# Patient Record
Sex: FEMALE | Race: WHITE | NOT HISPANIC OR LATINO | Employment: FULL TIME | URBAN - METROPOLITAN AREA
[De-identification: names, ages, dates, MRNs, and addresses within clinical notes are randomized per-mention and may not be internally consistent; named-entity substitution may affect disease eponyms.]

---

## 2017-08-03 ENCOUNTER — OFFICE VISIT (OUTPATIENT)
Dept: MEDICAL GROUP | Facility: PHYSICIAN GROUP | Age: 32
End: 2017-08-03
Payer: COMMERCIAL

## 2017-08-03 ENCOUNTER — HOSPITAL ENCOUNTER (OUTPATIENT)
Dept: RADIOLOGY | Facility: MEDICAL CENTER | Age: 32
End: 2017-08-03
Attending: NURSE PRACTITIONER
Payer: COMMERCIAL

## 2017-08-03 VITALS
BODY MASS INDEX: 21.25 KG/M2 | OXYGEN SATURATION: 99 % | DIASTOLIC BLOOD PRESSURE: 60 MMHG | RESPIRATION RATE: 16 BRPM | WEIGHT: 135.4 LBS | HEIGHT: 67 IN | HEART RATE: 75 BPM | TEMPERATURE: 98.4 F | SYSTOLIC BLOOD PRESSURE: 90 MMHG

## 2017-08-03 DIAGNOSIS — F41.9 ANXIETY: ICD-10-CM

## 2017-08-03 DIAGNOSIS — M25.511 CHRONIC RIGHT SHOULDER PAIN: ICD-10-CM

## 2017-08-03 DIAGNOSIS — G89.29 CHRONIC RIGHT SHOULDER PAIN: ICD-10-CM

## 2017-08-03 PROCEDURE — 99213 OFFICE O/P EST LOW 20 MIN: CPT | Performed by: NURSE PRACTITIONER

## 2017-08-03 PROCEDURE — 73030 X-RAY EXAM OF SHOULDER: CPT | Mod: RT

## 2017-08-03 RX ORDER — IBUPROFEN 800 MG/1
800 TABLET ORAL EVERY 6 HOURS PRN
Qty: 90 TAB | Refills: 1 | Status: SHIPPED | OUTPATIENT
Start: 2017-08-03 | End: 2018-01-15

## 2017-08-03 NOTE — PROGRESS NOTES
Chief Complaint   Patient presents with   • Establish Care     R shoulder issues and back pain       HPI:    Kendra Perez is a 32 y.o. female here to establish care and to discuss the following:    Anxiety  Patient has been taking Zoloft 50 mg daily for approximately one year while having some personal struggles. She states that she is doing well on the Zoloft. She denies any SI/HI. Patient's requesting refills today.    Chronic right shoulder pain  Patient has intermittent chronic right shoulder pain from many years of playing softball. She states in the last 2 weeks it has been bothering her. 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        Current medicines (including changes today)  Current Outpatient Prescriptions   Medication Sig Dispense Refill   • Cetirizine HCl (ZYRTEC ALLERGY PO) Take  by mouth.     • sertraline (ZOLOFT) 50 MG Tab Take 1 Tab by mouth every day. 30 Tab 3   • ibuprofen (MOTRIN) 800 MG Tab Take 1 Tab by mouth every 6 hours as needed for Inflammation. 90 Tab 1     No current facility-administered medications for this visit.     She  has a past medical history of Shoulder tendonitis.  She  has past surgical history that includes lumpectomy (2004) and other.  Social History   Substance Use Topics   • Smoking status: Current Some Day Smoker     Types: Cigarettes   • Smokeless tobacco: Never Used   • Alcohol Use: 6.0 oz/week     10 Standard drinks or equivalent per week     Social History     Social History Narrative     Family History   Problem Relation Age of Onset   • Arthritis Mother 40     rheumatoid   • Hypertension Father    • Lung Disease Brother      asthma   • Cancer Maternal Grandmother      breast, pancreatic   • Psychiatry Maternal Grandfather      dementia   • Heart Disease Paternal Grandmother    • Cancer Paternal Grandfather      leukemia   • Lung Disease Brother      asthma     Family Status   Relation Status Death Age   • Mother Alive    •  "Father Alive    • Brother Alive    • Maternal Grandmother  60     cancer   • Maternal Grandfather     • Paternal Grandmother     • Paternal Grandfather     • Brother Alive          ROS  Review of Systems   Constitutional: Negative for fever, chills, weight loss and malaise/fatigue.   HENT: Negative for ear pain, nosebleeds, congestion, sore throat and neck pain.    Eyes: Negative for blurred vision.   Respiratory: Negative for cough, sputum production, shortness of breath and wheezing.    Cardiovascular: Negative for chest pain, palpitations,  and leg swelling.   Gastrointestinal: Negative for heartburn, nausea, vomiting, diarrhea and abdominal pain.   Genitourinary: Negative for dysuria, urgency and frequency.   Musculoskeletal: Positive for right shoulder pain  Skin: Negative for rash and itching.   Neurological: Negative for dizziness, tingling, tremors, sensory change, focal weakness and headaches.   Endo/Heme/Allergies: Does not bruise/bleed easily.   Psychiatric/Behavioral: Negative for depression, anxiety, suicidal ideas, insomnia and memory loss.  Positive for anxiety    All other systems reviewed and are negative except as in HPI.    Health Maintenance:  Pap smear: Dr. Woodruff 2016 - normal  Immunizations: up to date           Objective:     Blood pressure 90/60, pulse 75, temperature 36.9 °C (98.4 °F), resp. rate 16, height 1.702 m (5' 7.01\"), weight 61.417 kg (135 lb 6.4 oz), last menstrual period 2017, SpO2 99 %. Body mass index is 21.2 kg/(m^2).  Physical Exam:  Constitutional: Alert, no distress.  Skin: Warm, dry, good turgor, no rashes in visible areas.  Eye: Equal, round and reactive, conjunctiva clear, lids normal.  ENMT: Lips without lesions, good dentition, oropharynx clear. Ear canals are clear, TMs within normal limits bilaterally.   Neck: Trachea midline, no masses, no thyromegaly. No cervical or supraclavicular lymphadenopathy.  Respiratory: Unlabored " respiratory effort, lungs clear to auscultation, no wheezes, no ronchi.  Cardiovascular: Normal S1, S2, no murmur, no edema.  Abdomen: Soft, non-tender, no masses, no hepatosplenomegaly.  Psych: Alert and oriented x3, normal affect and mood.  MS: Ambulates independently with steady gait. Has full range of motion of all extremities and spine.  Neuro: Cranial nerves intact. DTRs within normal limits. No neurological deficits.  Shoulder Exam: no swelling, tenderness, instability; ligaments intact, FROM shoulder joint, no bicipital groove tenderness, no impingements signs, remainder of shoulder exam is normal, ipsilateral elbow, wrist and hand exam is normal, contralateral shoulder exam is normal    Inspection: no atrophy, deformity, skin lesions, surgical scars.   Active ROM: Abduction 180 degrees, adduction and internal rotation to T7,  Abduction external rotation to C7.Lift off able with pain      Assessment and Plan:   The following treatment plan was discussed   1. Chronic right shoulder pain  ibuprofen (MOTRIN) 800 MG Tab    DX-SHOULDER 2+ RIGHT    MR-SHOULDER-W/O RIGHT   2. Anxiety  sertraline (ZOLOFT) 50 MG Tab   Patient is willing to do physical therapy as needed  Records requested.  Followup: Return if symptoms worsen or fail to improve, for shoulder pain,, anxiety.   Please note that this dictation was created using voice recognition software. I have made every reasonable attempt to correct obvious errors, but I expect that there are errors of grammar and possibly content that I did not discover before finalizing the note.

## 2017-08-03 NOTE — ASSESSMENT & PLAN NOTE
Patient has been taking Zoloft 50 mg daily for approximately one year while having some personal struggles. She states that she is doing well on the Zoloft. She denies any SI/HI. Patient's requesting refills today.

## 2017-08-03 NOTE — Clinical Note
[facility]  DILLON Pereira  [address]  [place] [address]  Fax: [phone]   Authorization for Release/Disclosure of   Protected Health Information   Name: KEDNRA ORNELAS : 1985 SSN: [id number]   Address: [address] [place] [address] [place] [address] Phone:    [phone] (home) [phone] (work)   I authorize the entity listed below to release/disclose the PHI below to:   [facility]/DILLON Pereira and DILLON Pereira   Provider or Entity Name:Dr. Woodruff     Address   [facility], [facility], [facility]   Phone:    Fax:   Reason for request: continuity of care   Information to be released:    [  ] LAST COLONOSCOPY,  including any PATH REPORT and follow-up  [  ] LAST FIT/COLOGUARD RESULT [  ] LAST DEXA  [  ] LAST MAMMOGRAM  [X  ] LAST PAP  [  ] LAST LABS [  ] RETINA EXAM REPORT  [  ] IMMUNIZATION RECORDS  [  ] Release all info          DATES OF SERVICE OR TIME PERIOD TO BE DISCLOSED: _____________  I understand and acknowledge that:  * This Authorization may be revoked at any time by you in writing, except if your health information has already been used or disclosed.  * Your health information that will be used or disclosed as a result of you signing this authorization could be re-disclosed by the recipient. If this occurs, your re-disclosed health information may no longer be protected by State or Federal laws.  * You may refuse to sign this Authorization. Your refusal will not affect your ability to obtain treatment.  * This Authorization becomes effective upon signing and will  on (date) __________.      If no date is indicated, this Authorization will  one (1) year from the signature date.    Name: Kendra Ornelas       Date: 8/3/2017       PLEASE FAX REQUESTED RECORDS BACK TO: [phone]

## 2017-08-03 NOTE — MR AVS SNAPSHOT
"Kendra Perez   8/3/2017 1:20 PM   Office Visit   MRN: [id number]    Department:  [facility]   Dept Phone:  [phone]    Description:  Female : 1985   Provider:  DILLON Pereira           Reason for Visit     Establish Care R shoulder issues and back pain      Allergies as of 8/3/2017     No Known Allergies      You were diagnosed with     Chronic right shoulder pain   [648139]       Anxiety   [280265]         Vital Signs     Blood Pressure Pulse Temperature Respirations Height Weight    90/60 mmHg 75 36.9 °C (98.4 °F) 16 1.702 m (5' 7.01\") 61.417 kg (135 lb 6.4 oz)    Body Mass Index Oxygen Saturation Last Menstrual Period Smoking Status          21.20 kg/m2 99% 2017 Current Some Day Smoker        Basic Information     Date Of Birth Sex Race Ethnicity Preferred Language    1985 Female White Non- English      Your appointments     Aug 07, 2017  3:00 PM   [facility] [place]   [facility] [place] (--)    [address] [address]   [phone]              Problem List              ICD-10-CM Priority Class Noted - Resolved    Chronic right shoulder pain M25.511, G89.29   8/3/2017 - Present    Anxiety F41.9   8/3/2017 - Present      Health Maintenance        Date Due Completion Dates    PAP SMEAR 3/17/2005 ---    IMM INFLUENZA (1) 2017, 10/22/2015, 10/27/2014    IMM DTaP/Tdap/Td Vaccine (2 - Td) 2020            Current Immunizations     Hepatitis A Vaccine, Ped/Adol 2015    Hepatitis B Vaccine Non-Recombivax (Ped/Adol) 2009, 2008, 2008    Influenza TIV (IM) 10/27/2014    Influenza Vaccine Quad Inj (Pf) 2016 10:37 PM    Influenza Vaccine Quad Inj (Preserved) 10/22/2015    MMR Vaccine 2002, 1985    Tdap Vaccine 2010      Below and/or attached are the medications your provider expects you to take. Review all of your home medications and newly ordered medications with your " provider and/or pharmacist. Follow medication instructions as directed by your provider and/or pharmacist. Please keep your medication list with you and share with your provider. Update the information when medications are discontinued, doses are changed, or new medications (including over-the-counter products) are added; and carry medication information at all times in the event of emergency situations     Allergies:  No Known Allergies          Medications  Valid as of: August 03, 2017 -  3:21 PM    Generic Name Brand Name Tablet Size Instructions for use    Cetirizine HCl   Take  by mouth.        Ibuprofen (Tab) MOTRIN 800 MG Take 1 Tab by mouth every 6 hours as needed for Inflammation.        Sertraline HCl (Tab) ZOLOFT 50 MG Take 1 Tab by mouth every day.        .                 Medicines prescribed today were sent to:     [id number] - [place], [place] - [address]  [address]    [address] [address]    Phone: [phone] Fax: [phone]    Open 24 Hours?: No    [id number]  [place] - [address] [facility]    [address] [facility] [address]    Phone: [phone] Fax: [phone]    Open 24 Hours?: No      Medication refill instructions:       If your prescription bottle indicates you have medication refills left, it is not necessary to call your provider’s office. Please contact your pharmacy and they will refill your medication.    If your prescription bottle indicates you do not have any refills left, you may request refills at any time through one of the following ways: The online 3225 films system (except Urgent Care), by calling your provider’s office, or by asking your pharmacy to contact your provider’s office with a refill request. Medication refills are processed only during regular business hours and may not be available until the next business day. Your provider may request additional information or to  have a follow-up visit with you prior to refilling your medication.   *Please Note: Medication refills are assigned a new Rx number when refilled electronically. Your pharmacy may indicate that no refills were authorized even though a new prescription for the same medication is available at the pharmacy. Please request the medicine by name with the pharmacy before contacting your provider for a refill.        Your To Do List     Future Labs/Procedures Complete By Expires    DX-SHOULDER 2+ RIGHT  As directed 2/3/2018         [employer] Access Code: [email or web address]  Expires: 9/2/2017  1:04 PM    [employer]  A secure, online tool to manage your health information     [facility]’s [employer]® is a secure, online tool that connects you to your personalized health information from the privacy of your home -- day or night - making it very easy for you to manage your healthcare. Once the activation process is completed, you can even access your medical information using the [employer] eric, which is available for free in the Apple Eric store or Google Play store.     [employer] provides the following levels of access (as shown below):   My Chart Features   [facility] Doctor [facility]  Specialists [facility]  Urgent  Care Non-[facility]  Primary Care  Doctor   Email your healthcare team securely and privately 24/7 X X X    Manage appointments: schedule your next appointment; view details of past/upcoming appointments X      Request prescription refills. X      View recent personal medical records, including lab and immunizations X X X X   View health record, including health history, allergies, medications X X X X   Read reports about your outpatient visits, procedures, consult and ER notes X X X X   See your discharge summary, which is a recap of your hospital and/or ER visit that includes your diagnosis, lab results, and care plan. X X       How to register for [employer]  1. Go to  [email or web address].  2. Click on the Sign Up Now  box, which takes you to the New Member Sign Up page. You will need to provide the following information:  a. Enter your [employer] Access Code exactly as it appears at the top of this page. (You will not need to use this code after you’ve completed the sign-up process. If you do not sign up before the expiration date, you must request a new code.)   b. Enter your date of birth.   c. Enter your home email address.   d. Click Submit, and follow the next screen’s instructions.  3. Create a [employer] ID. This will be your [employer] login ID and cannot be changed, so think of one that is secure and easy to remember.  4. Create a [employer] password. You can change your password at any time.  5. Enter your Password Reset Question and Answer. This can be used at a later time if you forget your password.   6. Enter your e-mail address. This allows you to receive e-mail notifications when new information is available in [employer].  7. Click Sign Up. You can now view your health information.    For assistance activating your [employer] account, call [phone]        Quit Tobacco Information     Do you want to quit using tobacco?    Quitting tobacco decreases risks of cancer, heart and lung disease, increases life expectancy, improves sense of taste and smell, and increases spending money, among other benefits.    If you are thinking about quitting, we can help.  • [facility] Quit Tobacco Program: 402.456.8926  o Program occurs weekly for four weeks and includes pharmacist consultation on products to support quitting smoking or chewing tobacco. A provider referral is needed for pharmacist consultation.  • Tobacco Users Help Hotline: 0-366-QUIT-NOW (270-2865) or https://[place].quitlogix.org/  o Free, confidential telephone and online coaching for [place] residents. Sessions are designed on a schedule that is convenient for you. Eligible clients receive free nicotine replacement therapy.  • Nationally: www.smokefree.gov  o Information and  professional assistance to support both immediate and long-term needs as you become, and remain, a non-smoker. Smokefree.gov allows you to choose the help that best fits your needs.

## 2017-08-04 ENCOUNTER — TELEPHONE (OUTPATIENT)
Dept: MEDICAL GROUP | Facility: PHYSICIAN GROUP | Age: 32
End: 2017-08-04

## 2017-08-04 NOTE — ASSESSMENT & PLAN NOTE
Patient has intermittent chronic right shoulder pain from many years of playing softball. She states in the last 2 weeks it has been bothering her. 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

## 2017-08-05 NOTE — TELEPHONE ENCOUNTER
----- Message from DILLON Pereira sent at 8/4/2017  5:25 PM PDT -----  Please call patient. I have reviewed their most recent shoulder x-ray results. They are within normal limits.  Followup as discussed.    Please message me with any questions or concerns.

## 2017-08-07 ENCOUNTER — HOSPITAL ENCOUNTER (OUTPATIENT)
Dept: RADIOLOGY | Facility: MEDICAL CENTER | Age: 32
End: 2017-08-07
Attending: NURSE PRACTITIONER
Payer: COMMERCIAL

## 2017-08-07 DIAGNOSIS — G89.29 CHRONIC RIGHT SHOULDER PAIN: ICD-10-CM

## 2017-08-07 DIAGNOSIS — M25.511 CHRONIC RIGHT SHOULDER PAIN: ICD-10-CM

## 2017-08-07 PROCEDURE — 73221 MRI JOINT UPR EXTREM W/O DYE: CPT | Mod: RT

## 2017-08-08 DIAGNOSIS — S43.431A SUPERIOR GLENOID LABRUM LESION OF RIGHT SHOULDER, INITIAL ENCOUNTER: ICD-10-CM

## 2017-08-11 ENCOUNTER — HOSPITAL ENCOUNTER (OUTPATIENT)
Dept: LAB | Facility: MEDICAL CENTER | Age: 32
End: 2017-08-11
Payer: COMMERCIAL

## 2017-08-11 LAB
BDY FAT % MEASURED: 23.9 %
BP DIAS: 66 MMHG
BP SYS: 105 MMHG
CHOLEST SERPL-MCNC: 174 MG/DL (ref 100–199)
DIABETES HTDIA: NO
EVENT NAME HTEVT: NORMAL
FASTING HTFAS: YES
GLUCOSE SERPL-MCNC: 72 MG/DL (ref 65–99)
HDLC SERPL-MCNC: 81 MG/DL
HYPERTENSION HTHYP: NO
LDLC SERPL CALC-MCNC: 80 MG/DL
SCREENING LOC CITY HTCIT: NORMAL
SCREENING LOC STATE HTSTA: NORMAL
SCREENING LOCATION HTLOC: NORMAL
SMOKING HTSMO: YES
SUBSCRIBER ID HTSID: NORMAL
TRIGL SERPL-MCNC: 63 MG/DL (ref 0–149)

## 2017-08-11 PROCEDURE — S5190 WELLNESS ASSESSMENT BY NONPH: HCPCS

## 2017-08-11 PROCEDURE — 80061 LIPID PANEL: CPT

## 2017-08-11 PROCEDURE — 82947 ASSAY GLUCOSE BLOOD QUANT: CPT

## 2017-08-31 ENCOUNTER — TELEPHONE (OUTPATIENT)
Dept: OCCUPATIONAL MEDICINE | Facility: CLINIC | Age: 32
End: 2017-08-31

## 2017-09-18 ENCOUNTER — EH NON-PROVIDER (OUTPATIENT)
Dept: OCCUPATIONAL MEDICINE | Facility: CLINIC | Age: 32
End: 2017-09-18

## 2017-09-18 DIAGNOSIS — Z29.89 NEED FOR ISOLATION: ICD-10-CM

## 2017-09-18 PROCEDURE — 94375 RESPIRATORY FLOW VOLUME LOOP: CPT

## 2017-10-01 ENCOUNTER — IMMUNIZATION (OUTPATIENT)
Dept: OCCUPATIONAL MEDICINE | Facility: CLINIC | Age: 32
End: 2017-10-01

## 2017-10-01 DIAGNOSIS — Z23 NEED FOR VACCINATION: ICD-10-CM

## 2017-10-01 PROCEDURE — 90686 IIV4 VACC NO PRSV 0.5 ML IM: CPT | Performed by: PREVENTIVE MEDICINE

## 2018-01-15 DIAGNOSIS — Z01.812 PRE-OPERATIVE LABORATORY EXAMINATION: ICD-10-CM

## 2018-01-15 LAB — HCG SERPL QL: NEGATIVE

## 2018-01-15 PROCEDURE — 36415 COLL VENOUS BLD VENIPUNCTURE: CPT

## 2018-01-15 PROCEDURE — 84703 CHORIONIC GONADOTROPIN ASSAY: CPT

## 2018-01-15 NOTE — OR NURSING
"Pre-admit appointment completed. \"Preparing for your procedure\" sheet given to pt along with verbal and written instructions. Pt instructed to continue regularly prescribed medications through the day before surgery. Pt instructed to take the following medications the day of surgery with a sip of water, per anesthesia protocol; tylenol if needed.   "

## 2018-01-17 ENCOUNTER — HOSPITAL ENCOUNTER (OUTPATIENT)
Facility: MEDICAL CENTER | Age: 33
End: 2018-01-17
Attending: ORTHOPAEDIC SURGERY | Admitting: ORTHOPAEDIC SURGERY
Payer: COMMERCIAL

## 2018-01-17 VITALS
BODY MASS INDEX: 23.42 KG/M2 | HEART RATE: 77 BPM | OXYGEN SATURATION: 98 % | WEIGHT: 145.72 LBS | HEIGHT: 66 IN | DIASTOLIC BLOOD PRESSURE: 79 MMHG | TEMPERATURE: 97.3 F | SYSTOLIC BLOOD PRESSURE: 103 MMHG | RESPIRATION RATE: 14 BRPM

## 2018-01-17 PROCEDURE — 160041 HCHG SURGERY MINUTES - EA ADDL 1 MIN LEVEL 4: Performed by: ORTHOPAEDIC SURGERY

## 2018-01-17 PROCEDURE — 700111 HCHG RX REV CODE 636 W/ 250 OVERRIDE (IP)

## 2018-01-17 PROCEDURE — 500562 HCHG FIBERWIRE: Performed by: ORTHOPAEDIC SURGERY

## 2018-01-17 PROCEDURE — 700102 HCHG RX REV CODE 250 W/ 637 OVERRIDE(OP)

## 2018-01-17 PROCEDURE — 160035 HCHG PACU - 1ST 60 MINS PHASE I: Performed by: ORTHOPAEDIC SURGERY

## 2018-01-17 PROCEDURE — 160029 HCHG SURGERY MINUTES - 1ST 30 MINS LEVEL 4: Performed by: ORTHOPAEDIC SURGERY

## 2018-01-17 PROCEDURE — A9270 NON-COVERED ITEM OR SERVICE: HCPCS

## 2018-01-17 PROCEDURE — 160022 HCHG BLOCK: Performed by: ORTHOPAEDIC SURGERY

## 2018-01-17 PROCEDURE — A6223 GAUZE >16<=48 NO W/SAL W/O B: HCPCS | Performed by: ORTHOPAEDIC SURGERY

## 2018-01-17 PROCEDURE — 160009 HCHG ANES TIME/MIN: Performed by: ORTHOPAEDIC SURGERY

## 2018-01-17 PROCEDURE — 700101 HCHG RX REV CODE 250

## 2018-01-17 PROCEDURE — 502581 HCHG PACK, SHOULDER ARTHROSCOPY: Performed by: ORTHOPAEDIC SURGERY

## 2018-01-17 PROCEDURE — 160002 HCHG RECOVERY MINUTES (STAT): Performed by: ORTHOPAEDIC SURGERY

## 2018-01-17 PROCEDURE — 502000 HCHG MISC OR IMPLANTS RC 0278: Performed by: ORTHOPAEDIC SURGERY

## 2018-01-17 PROCEDURE — 160048 HCHG OR STATISTICAL LEVEL 1-5: Performed by: ORTHOPAEDIC SURGERY

## 2018-01-17 PROCEDURE — 160046 HCHG PACU - 1ST 60 MINS PHASE II: Performed by: ORTHOPAEDIC SURGERY

## 2018-01-17 PROCEDURE — 501838 HCHG SUTURE GENERAL: Performed by: ORTHOPAEDIC SURGERY

## 2018-01-17 PROCEDURE — 502240 HCHG MISC OR SUPPLY RC 0272: Performed by: ORTHOPAEDIC SURGERY

## 2018-01-17 PROCEDURE — 160025 RECOVERY II MINUTES (STATS): Performed by: ORTHOPAEDIC SURGERY

## 2018-01-17 DEVICE — ANCHOR SUTURE ICONIX 2 WITH #2 FORCE FIBER 2.3MM (5EA/BX): Type: IMPLANTABLE DEVICE | Status: FUNCTIONAL

## 2018-01-17 RX ORDER — DIPHENHYDRAMINE HYDROCHLORIDE 50 MG/ML
25 INJECTION INTRAMUSCULAR; INTRAVENOUS EVERY 6 HOURS PRN
Status: DISCONTINUED | OUTPATIENT
Start: 2018-01-17 | End: 2018-01-17 | Stop reason: HOSPADM

## 2018-01-17 RX ORDER — OXYCODONE HYDROCHLORIDE 10 MG/1
10 TABLET ORAL
Status: DISCONTINUED | OUTPATIENT
Start: 2018-01-17 | End: 2018-01-17 | Stop reason: HOSPADM

## 2018-01-17 RX ORDER — HALOPERIDOL 5 MG/ML
1 INJECTION INTRAMUSCULAR EVERY 6 HOURS PRN
Status: DISCONTINUED | OUTPATIENT
Start: 2018-01-17 | End: 2018-01-17 | Stop reason: HOSPADM

## 2018-01-17 RX ORDER — GABAPENTIN 300 MG/1
CAPSULE ORAL
Status: COMPLETED
Start: 2018-01-17 | End: 2018-01-17

## 2018-01-17 RX ORDER — MORPHINE SULFATE 4 MG/ML
4 INJECTION, SOLUTION INTRAMUSCULAR; INTRAVENOUS
Status: DISCONTINUED | OUTPATIENT
Start: 2018-01-17 | End: 2018-01-17 | Stop reason: HOSPADM

## 2018-01-17 RX ORDER — ONDANSETRON 2 MG/ML
4 INJECTION INTRAMUSCULAR; INTRAVENOUS EVERY 4 HOURS PRN
Status: DISCONTINUED | OUTPATIENT
Start: 2018-01-17 | End: 2018-01-17 | Stop reason: HOSPADM

## 2018-01-17 RX ORDER — LIDOCAINE HYDROCHLORIDE 10 MG/ML
INJECTION, SOLUTION INFILTRATION; PERINEURAL
Status: COMPLETED
Start: 2018-01-17 | End: 2018-01-17

## 2018-01-17 RX ORDER — OXYCODONE HYDROCHLORIDE 5 MG/1
5 TABLET ORAL
Status: DISCONTINUED | OUTPATIENT
Start: 2018-01-17 | End: 2018-01-17 | Stop reason: HOSPADM

## 2018-01-17 RX ORDER — ACETAMINOPHEN 500 MG
TABLET ORAL
Status: COMPLETED
Start: 2018-01-17 | End: 2018-01-17

## 2018-01-17 RX ORDER — EPINEPHRINE 1 MG/ML(1)
AMPUL (ML) INJECTION
Status: DISCONTINUED | OUTPATIENT
Start: 2018-01-17 | End: 2018-01-17 | Stop reason: HOSPADM

## 2018-01-17 RX ORDER — DEXAMETHASONE SODIUM PHOSPHATE 4 MG/ML
4 INJECTION, SOLUTION INTRA-ARTICULAR; INTRALESIONAL; INTRAMUSCULAR; INTRAVENOUS; SOFT TISSUE
Status: DISCONTINUED | OUTPATIENT
Start: 2018-01-17 | End: 2018-01-17 | Stop reason: HOSPADM

## 2018-01-17 RX ORDER — BUPIVACAINE HYDROCHLORIDE 5 MG/ML
INJECTION, SOLUTION EPIDURAL; INTRACAUDAL
Status: DISCONTINUED
Start: 2018-01-17 | End: 2018-01-17 | Stop reason: HOSPADM

## 2018-01-17 RX ORDER — BUPIVACAINE HYDROCHLORIDE AND EPINEPHRINE 5; 5 MG/ML; UG/ML
INJECTION, SOLUTION EPIDURAL; INTRACAUDAL; PERINEURAL
Status: DISCONTINUED | OUTPATIENT
Start: 2018-01-17 | End: 2018-01-17 | Stop reason: HOSPADM

## 2018-01-17 RX ORDER — SCOLOPAMINE TRANSDERMAL SYSTEM 1 MG/1
PATCH, EXTENDED RELEASE TRANSDERMAL
Status: COMPLETED
Start: 2018-01-17 | End: 2018-01-17

## 2018-01-17 RX ORDER — CELECOXIB 200 MG/1
CAPSULE ORAL
Status: COMPLETED
Start: 2018-01-17 | End: 2018-01-17

## 2018-01-17 RX ORDER — SODIUM CHLORIDE, SODIUM LACTATE, POTASSIUM CHLORIDE, CALCIUM CHLORIDE 600; 310; 30; 20 MG/100ML; MG/100ML; MG/100ML; MG/100ML
INJECTION, SOLUTION INTRAVENOUS
Status: DISCONTINUED | OUTPATIENT
Start: 2018-01-17 | End: 2018-01-17 | Stop reason: HOSPADM

## 2018-01-17 RX ORDER — OXYCODONE HCL 20 MG/1
TABLET, FILM COATED, EXTENDED RELEASE ORAL
Status: COMPLETED
Start: 2018-01-17 | End: 2018-01-17

## 2018-01-17 RX ORDER — SCOLOPAMINE TRANSDERMAL SYSTEM 1 MG/1
1 PATCH, EXTENDED RELEASE TRANSDERMAL
Status: DISCONTINUED | OUTPATIENT
Start: 2018-01-17 | End: 2018-01-17 | Stop reason: HOSPADM

## 2018-01-17 RX ADMIN — ACETAMINOPHEN 1000 MG: 500 TABLET, COATED ORAL at 11:32

## 2018-01-17 RX ADMIN — GABAPENTIN 300 MG: 300 CAPSULE ORAL at 11:32

## 2018-01-17 RX ADMIN — LIDOCAINE HYDROCHLORIDE 0.2 ML: 10 INJECTION, SOLUTION INFILTRATION; PERINEURAL at 11:31

## 2018-01-17 RX ADMIN — CELECOXIB 400 MG: 200 CAPSULE ORAL at 11:32

## 2018-01-17 RX ADMIN — SCOPALAMINE 1 PATCH: 1 PATCH, EXTENDED RELEASE TRANSDERMAL at 11:32

## 2018-01-17 RX ADMIN — OXYCODONE HYDROCHLORIDE 20 MG: 20 TABLET, FILM COATED, EXTENDED RELEASE ORAL at 11:32

## 2018-01-17 ASSESSMENT — PAIN SCALES - GENERAL
PAINLEVEL_OUTOF10: 0
PAINLEVEL_OUTOF10: 0
PAINLEVEL_OUTOF10: 4
PAINLEVEL_OUTOF10: 0

## 2018-01-17 NOTE — OP REPORT
DATE OF SERVICE:  01/17/2018    PREOPERATIVE DIAGNOSIS:  Internal derangement, right shoulder.    POSTOPERATIVE DIAGNOSES:  1.  Right shoulder SLAP tear.  2.  Shoulder rotator cuff impingement.    PROCEDURES:  1.  Arthroscopy, surgical, shoulder; with treatment of SLAP tear utilizing   biceps tenodesis.  2.  Arthroscopy, shoulder, surgical; with debridement of glenohumeral joint.  3.  Arthroscopy, surgical, shoulder; with subacromial decompression.    SURGEON:  Khadar Pate MD    ASSISTANT:  Milad Grossman CFA    ANESTHESIA:  LMA anesthesia with interscalene block.    ANESTHESIOLOGIST:  Lupe Ashby MD    ESTIMATED BLOOD LOSS:  Minimal.    COMPLICATIONS:  None.    INDICATIONS FOR PROCEDURE:  This is a 32-year-old white female with   significant mechanical symptoms in her right shoulder.  She has failed   conservative management including physical therapy.  MRI demonstrates a SLAP   tear.  She presents for operative treatment.  For further details of H and P,   please see chart.    Risks, benefits, and alternatives of the procedure were discussed with patient   include but not limited to bleeding, infection, neurovascular injury, need   for further surgery, PE, DVT, blood transfusion with its associated risks,   anesthesia with its associated risks, and death.  All questions were answered.    No warranties or guarantees were given or implied.  Consent is signed and is   on chart.    DESCRIPTION OF PROCEDURE:  Patient was taken to operating room, placed supine   on the operating table.  Prior to this, an interscalene block was placed in   preoperative holding for postoperative pain control.  LMA anesthesia was   induced.  Patient was placed in a beach-chair position.  All bony prominences   were well padded.  Right shoulder and upper extremity were prepped and draped   in normal sterile fashion.  The arm was placed in spider arm maxwell.    Posterior portal was identified and the spinal needle was placed in the    glenohumeral joint.  This was insufflated with 60 mL of normal saline.    Posterior portal was incised and arthroscope placed into the shoulder.    Anterior portal was then created using outside-in technique.  Examination of   shoulder showed the subscap to be intact.  Biceps tendon was in good   condition.  There was significant fraying of the superior labrum.  There is no   chondromalacia of the glenoid or humeral head.  Rotator cuff was normal.    Probe was placed through the anterior portal, showed complete liftoff of the   biceps anchor.  It was felt the best course in this patient would be a biceps   tenodesis; therefore, a tag stitch was placed using a spinal needle through   the biceps tendon.  A biceps tenotomy was then performed.  Further examination   of shoulder this point showed no further abnormalities.  At this point,   arthroscope was removed from the glenohumeral joint and placed in the   subacromial space.  A lateral portal was created.  Soft tissue removed off the   underside of the acromion, which showed a fairly sharp acromial spur.  This   was debrided back using a bone shaver.  A bursectomy was performed and the   cuff from the bursal side showed no signs of any significant tearing.  The   arthroscope was removed from the shoulder.  The bicipital groove was   identified on the anterior arm just inferior to the pec insertion.  A 3 cm   incision was made in this area.  Dissection carried down at the level of the   bicipital groove.  Tendon was brought out through the wound.  The bone was   roughed up using a Reynolds.  Two ICONIX 2-4 anchors were placed, one   approximately 1 cm apart in this area.  These were double loaded.  Both   sutures from each anchor were woven through the biceps tendon.  These were   tied to reapproximate the tendon into the groove.  This reapproximated quite   well.  Wound was then thoroughly lavaged.  Subcutaneous tissue closed using   2-0 Vicryl and skin closed using  4-0 nylon.  The portals were closed using 4-0   nylon.  The patient was placed in a soft sterile dressing and abduction   pillow.  She was awakened from anesthesia and returned to recovery cart in the   recovery room in stable condition.  There is no rossy or intraoperative   complications noted.       ____________________________________     MD APRIL Miguel / EDGAR    DD:  01/17/2018 13:01:44  DT:  01/17/2018 14:08:52    D#:  [id number]  Job#:  [id number]

## 2018-01-17 NOTE — OR SURGEON
Immediate Post OP Note    PreOp Diagnosis: Internal derangment rt shoulder    PostOp Diagnosis: Rt shoulder SLAP tear, RC impingement    Procedure(s):  SHOULDER DECOMPRESSION ARTHROSCOPIC - SUBACROMIAL - Wound Class: Clean  SHOULDER ARTHROSCOPY W/ BICIPITAL TENODESIS for SLAP tear    Surgeon(s):  Khadar Pate M.D.    Anesthesiologist/Type of Anesthesia:  Anesthesiologist: Lupe Ashby M.D./General    Surgical Staff:  Assistant: Milad Grossman C.NReemaAReema  Circulator: Roland Du R.N.  Scrub Person: Mark Seymour    Specimens:  * No specimens in log *    Estimated Blood Loss: minimal    Findings: SLAP tear    Complications: none        1/17/2018 12:56 PM Khadar Pate

## 2018-01-17 NOTE — OR NURSING
1257: To PACU from OR via gurney, drowsy, rouses to deny pain/nausea, respirations spontaneous and non-labored. Icepack applied over c/d/i R shoulder surgical dressings.  1303: O2 mask d/krys at pt request  1305: Commenced po water  1320: Remains comfortable, tolerates RA and po water. No change in surgical site assessment. Meets criteria to transfer to Stage 2.

## 2018-01-17 NOTE — OR NURSING
1109 PT TO PRE OP TO ASSUME CARE.  1150 Patient allergies and NPO status verified, home medication reconciliation completed and belongings secured. Patient verbalizes understanding of pain scale, expected course of stay and plan of care. Surgical site verified with patient. IV access established. Sequentials placed on legs

## 2018-01-17 NOTE — OR NURSING
1325: Pt arrived to [facility] post right shoulder arthroscopy/interscalene nerve block, RN report, Pt awake/alert, Denies pain/nausea, RUE in immobilizer/able to wiggle fingers/warm to touch  1340:  in St[facility] II, MD updated  earlier, DC instructions completed with pt/-verbalized understanding, Denies pain/nausea, RUE remains in immobilizer/ice pack in place, Pt has prescriptions at home  1400: IV removed  1405: Pt discharged via WC/CNA

## 2018-01-17 NOTE — DISCHARGE INSTRUCTIONS
ACTIVITY: Rest and take it easy for the first 24 hours.  A responsible adult is recommended to remain with you during that time.  It is normal to feel sleepy.  We encourage you to not do anything that requires balance, judgment or coordination.    MILD FLU-LIKE SYMPTOMS ARE NORMAL. YOU MAY EXPERIENCE GENERALIZED MUSCLE ACHES, THROAT IRRITATION, HEADACHE AND/OR SOME NAUSEA.    FOR 24 HOURS DO NOT:  Drive, operate machinery or run household appliances.  Drink beer or alcoholic beverages.   Make important decisions or sign legal documents.    SPECIAL INSTRUCTIONS: Ice and elevate R shoulder. Wear immobilizer at all times until instructed otherwise by your surgeon.     DIET: To avoid nausea, slowly advance diet as tolerated, avoiding spicy or greasy foods for the first day.  Add more substantial food to your diet according to your physician's instructions. INCREASE FLUIDS AND FIBER TO AVOID CONSTIPATION.    SURGICAL DRESSING/BATHING: Keep dressing clean, dry and intact until instructed otherwise by surgeon  FOLLOW-UP APPOINTMENT:  A follow-up appointment should be arranged with your doctor; call to schedule.    You should CALL YOUR PHYSICIAN if you develop:  Fever greater than 101 degrees F.  Pain not relieved by medication, or persistent nausea or vomiting.  Excessive bleeding (blood soaking through dressing) or unexpected drainage from the wound.  Extreme redness or swelling around the incision site, drainage of pus or foul smelling drainage.  Inability to urinate or empty your bladder within 8 hours.  Problems with breathing or chest pain.    You should call 911 if you develop problems with breathing or chest pain.  If you are unable to contact your doctor or surgical center, you should go to the nearest emergency room or urgent care center.  Physician's telephone #: [phone]    If any questions arise, call your doctor.  If your doctor is not available, please feel free to call the Surgical Center at [phone].   The Center is open Monday through Friday from 7AM to 7PM.  You can also call the HEALTH HOTLINE open 24 hours/day, 7 days/week and speak to a nurse at [phone], or toll free at [phone].    A registered nurse may call you a few days after your surgery to see how you are doing after your procedure.    MEDICATIONS: Resume taking daily medication.  Take prescribed pain medication with food.  If no medication is prescribed, you may take non-aspirin pain medication if needed.  PAIN MEDICATION CAN BE VERY CONSTIPATING.  Take a stool softener or laxative such as senokot, pericolace, or milk of magnesia if needed.    Prescription given pre-operatively.  Last pain medication given at N/A.    If your physician has prescribed pain medication that includes Acetaminophen (Tylenol), do not take additional Acetaminophen (Tylenol) while taking the prescribed medication.    Depression / Suicide Risk    As you are discharged from this [facility] facility, it is important to learn how to keep safe from harming yourself.    Recognize the warning signs:  · Abrupt changes in personality, positive or negative- including increase in energy   · Giving away possessions  · Change in eating patterns- significant weight changes-  positive or negative  · Change in sleeping patterns- unable to sleep or sleeping all the time   · Unwillingness or inability to communicate  · Depression  · Unusual sadness, discouragement and loneliness  · Talk of wanting to die  · Neglect of personal appearance   · Rebelliousness- reckless behavior  · Withdrawal from people/activities they love  · Confusion- inability to concentrate     If you or a loved one observes any of these behaviors or has concerns about self-harm, here's what you can do:  · Talk about it- your feelings and reasons for harming yourself  · Remove any means that you might use to hurt yourself (examples: pills, rope, extension cords, firearm)  · Get professional help from the  "community (Mental Health, Substance Abuse, psychological counseling)  · Do not be alone:Call your Safe Contact- someone whom you trust who will be there for you.  · Call your local CRISIS HOTLINE 363-0313 or 086-344-8125  · Call your local Children's Mobile Crisis Response Team [place] (195) 867-7909 or www.[employer]  · Call the toll free National Suicide Prevention Hotlines   · National Suicide Prevention Lifeline 379-682-GIAF (8539)  [place] [facility] 800-SUICIDE (790-5104)    Peripheral Nerve Block Discharge Instructions from Same Day Surgery and Inpatient :    What to Expect - Upper Extremity  · You may experience numbness and weakness in shoulder, arm and hand  on the same side as your surgery  · This is normal. For some people, this may be an unpleasant sensation. Be very careful with your numb limb  · Ask for help when you need it  Shoulder Surgery Side Effects  · In addition to numbness and weakness you may experience other symptoms  · Other nerves that are close to those nerves injected can also be affected by local anesthesia  · You may experience a hoarseness in your voice  · Your breathing may feel different  · You may also notice drooping of your eyelid, pupil constriction, and decreased sweating, on the side of your surgery  · All of these side effects are normal and will resolve when the local anesthetic wears off   Prevent Injury  · Protect the limb like a baby  · Beware of exposing your limb to extreme heat or cold or trauma  · The limb may be injured without you noticing because it is numb  · Keep the limb elevated whenever possible  · Do not sleep on the limb  · Change the position of the limb regularly  · Avoid putting pressure on your surgical limb  Pain Control  · The initial block on the day of surgery will make your extremity feel \"numb\"  · Any consecutive injection including prior to discharge from the hospital will make your extremity feel \"numb\"  · You may feel an " aching or burning when the local anesthesia starts to wear off  · Take pain pills as prescribed by your surgeon  · Call your surgeon or anesthesiologist if you do not have adequate pain control  ·

## 2018-01-29 ENCOUNTER — TELEPHONE (OUTPATIENT)
Dept: MEDICAL GROUP | Facility: LAB | Age: 33
End: 2018-01-29

## 2018-01-29 DIAGNOSIS — F41.9 ANXIETY: ICD-10-CM

## 2018-01-30 NOTE — TELEPHONE ENCOUNTER
Pt called and her PCP is no longer there. She thought she had refills but went to get them and found out she didn't have any. She will be reestablishing with another provider but is currently requesting refill.   Refill sent in.   Pt is doing well, denies ANDREAS/TRACE Alegria

## 2018-02-07 ENCOUNTER — OFFICE VISIT (OUTPATIENT)
Dept: URGENT CARE | Facility: CLINIC | Age: 33
End: 2018-02-07
Payer: COMMERCIAL

## 2018-02-07 VITALS
BODY MASS INDEX: 22.98 KG/M2 | WEIGHT: 143 LBS | HEIGHT: 66 IN | HEART RATE: 62 BPM | SYSTOLIC BLOOD PRESSURE: 110 MMHG | OXYGEN SATURATION: 95 % | TEMPERATURE: 97.8 F | DIASTOLIC BLOOD PRESSURE: 80 MMHG

## 2018-02-07 DIAGNOSIS — J03.90 TONSILLITIS: ICD-10-CM

## 2018-02-07 LAB
INT CON NEG: NORMAL
INT CON POS: NORMAL
S PYO AG THROAT QL: POSITIVE

## 2018-02-07 PROCEDURE — 87880 STREP A ASSAY W/OPTIC: CPT | Performed by: PHYSICIAN ASSISTANT

## 2018-02-07 PROCEDURE — 99213 OFFICE O/P EST LOW 20 MIN: CPT | Performed by: PHYSICIAN ASSISTANT

## 2018-02-07 RX ORDER — CEFUROXIME AXETIL 500 MG/1
500 TABLET ORAL 2 TIMES DAILY
Qty: 20 TAB | Refills: 0 | Status: SHIPPED | OUTPATIENT
Start: 2018-02-07 | End: 2018-02-17

## 2018-02-07 RX ORDER — DEXAMETHASONE 4 MG/1
8 TABLET ORAL DAILY
Qty: 4 TAB | Refills: 0 | Status: SHIPPED | OUTPATIENT
Start: 2018-02-07 | End: 2018-02-09

## 2018-02-07 ASSESSMENT — ENCOUNTER SYMPTOMS
SWOLLEN GLANDS: 1
COUGH: 0
SORE THROAT: 1
TROUBLE SWALLOWING: 1
EYES NEGATIVE: 1
RESPIRATORY NEGATIVE: 1
CONSTITUTIONAL NEGATIVE: 1
CARDIOVASCULAR NEGATIVE: 1

## 2018-02-07 NOTE — PROGRESS NOTES
"Subjective:      Kendra Perez is a 32 y.o. female who presents with Pharyngitis (x yesterday, sore throat, swollen glands and pain to swallow)            Pharyngitis    This is a new problem. The current episode started yesterday. The problem has been unchanged. Neither side of throat is experiencing more pain than the other. There has been no fever. The pain is moderate. Associated symptoms include swollen glands and trouble swallowing. Pertinent negatives include no coughing. She has had no exposure to strep or mono. She has tried nothing for the symptoms. The treatment provided no relief.       Review of Systems   Constitutional: Negative.    HENT: Positive for sore throat and trouble swallowing.    Eyes: Negative.    Respiratory: Negative.  Negative for cough.    Cardiovascular: Negative.    Skin: Negative.           Objective:     /80   Pulse 62   Temp 36.6 °C (97.8 °F)   Ht 1.676 m (5' 6\")   Wt 64.9 kg (143 lb)   SpO2 95%   BMI 23.08 kg/m²      Physical Exam   Constitutional: She is oriented to person, place, and time. She appears well-developed and well-nourished. No distress.   HENT:   Head: Normocephalic and atraumatic.   +phar./tons redn     Eyes: EOM are normal. Pupils are equal, round, and reactive to light.   Neck: Normal range of motion. Neck supple.   Cardiovascular: Normal rate.    Pulmonary/Chest: Effort normal and breath sounds normal. No respiratory distress.   Lymphadenopathy:     She has cervical adenopathy.   Neurological: She is alert and oriented to person, place, and time.   Skin: Skin is warm and dry.   Psychiatric: She has a normal mood and affect. Her behavior is normal.     Vitals:    02/07/18 1057   BP: 110/80   Pulse: 62   Temp: 36.6 °C (97.8 °F)   SpO2: 95%   Weight: 64.9 kg (143 lb)   Height: 1.676 m (5' 6\")     Active Ambulatory Problems     Diagnosis Date Noted   • Chronic right shoulder pain 08/03/2017   • Anxiety 08/03/2017     Resolved Ambulatory Problems     " Diagnosis Date Noted   • No Resolved Ambulatory Problems     Past Medical History:   Diagnosis Date   • Anxiety disorder    • Cold 01/15/2018   • Seasonal allergies    • Shoulder tendonitis      Current Outpatient Prescriptions on File Prior to Visit   Medication Sig Dispense Refill   • sertraline (ZOLOFT) 50 MG Tab Take 1 Tab by mouth every day. 90 Tab 3   • Cetirizine HCl (ZYRTEC ALLERGY PO) Take  by mouth.     • Melatonin 5 MG Cap Take  by mouth at bedtime as needed.     • Acetaminophen (TYLENOL PO) Take 650 mg by mouth as needed.     • Multiple Vitamins-Minerals (MULTIVITAMIN ADULT PO) Take  by mouth every day.       No current facility-administered medications on file prior to visit.      Social History     Social History   • Marital status:      Spouse name: N/A   • Number of children: N/A   • Years of education: N/A     Occupational History   • Not on file.     Social History Main Topics   • Smoking status: Never Smoker   • Smokeless tobacco: Never Used   • Alcohol use 6.0 oz/week     10 Standard drinks or equivalent per week      Comment: 2-10 per week   • Drug use: No   • Sexual activity: Yes     Partners: Male     Birth control/ protection: IUD      Comment:      Other Topics Concern   • Not on file     Social History Narrative   • No narrative on file     Family History   Problem Relation Age of Onset   • Arthritis Mother 40     rheumatoid   • Hypertension Father    • Lung Disease Brother      asthma   • Cancer Maternal Grandmother      breast, pancreatic   • Psychiatry Maternal Grandfather      dementia   • Heart Disease Paternal Grandmother    • Cancer Paternal Grandfather      leukemia   • Lung Disease Brother      asthma     Patient has no known allergies.         rst +     Assessment/Plan:     ·  tonsillitis      Gargles, Cepacol lozenges, Aleve/Advil as needed for throat pain; rx abx   Return to clinic 3-5 days if symptoms persist or worsen or follow up with Primary Doctor

## 2018-02-15 ENCOUNTER — OFFICE VISIT (OUTPATIENT)
Dept: MEDICAL GROUP | Facility: MEDICAL CENTER | Age: 33
End: 2018-02-15
Payer: COMMERCIAL

## 2018-02-15 VITALS
TEMPERATURE: 97.7 F | BODY MASS INDEX: 23.38 KG/M2 | RESPIRATION RATE: 12 BRPM | HEART RATE: 72 BPM | WEIGHT: 145.5 LBS | OXYGEN SATURATION: 97 % | HEIGHT: 66 IN | DIASTOLIC BLOOD PRESSURE: 76 MMHG | SYSTOLIC BLOOD PRESSURE: 108 MMHG

## 2018-02-15 DIAGNOSIS — T78.40XA ALLERGIC REACTION, INITIAL ENCOUNTER: ICD-10-CM

## 2018-02-15 DIAGNOSIS — G89.29 CHRONIC RIGHT SHOULDER PAIN: ICD-10-CM

## 2018-02-15 DIAGNOSIS — B07.8 COMMON WART: ICD-10-CM

## 2018-02-15 DIAGNOSIS — Z76.89 ESTABLISHING CARE WITH NEW DOCTOR, ENCOUNTER FOR: ICD-10-CM

## 2018-02-15 DIAGNOSIS — M25.511 CHRONIC RIGHT SHOULDER PAIN: ICD-10-CM

## 2018-02-15 PROCEDURE — 99213 OFFICE O/P EST LOW 20 MIN: CPT | Mod: 25 | Performed by: INTERNAL MEDICINE

## 2018-02-15 PROCEDURE — 17110 DESTRUCTION B9 LES UP TO 14: CPT | Performed by: INTERNAL MEDICINE

## 2018-02-15 ASSESSMENT — PATIENT HEALTH QUESTIONNAIRE - PHQ9: CLINICAL INTERPRETATION OF PHQ2 SCORE: 0

## 2018-02-16 NOTE — PROGRESS NOTES
Chief Complaint   Patient presents with   • Establish Care     transfer from [place]   • Medication Management     discuss zoloft     Chief complaint: Multiple warts on her hands would like to have frozen.      HISTORY OF PRESENT ILLNESS: Patient is a 32 y.o. female patient who presents today to discuss the evaluation and management of:          1. Establishing care with new doctor, encounter for    Patient has not had a regular doctor before this, she sees a gynecologist however for her routine female wellness exams. She is scheduled for a Pap smear next month. She is a [employer] employee, she works on the telemetry floor as a nurse.    2. Chronic right shoulder pain    Patient had shoulder surgery done one month ago for a torn labrum. She remains in a shoulder immobilizer. She is off of her pain medications and is doing physical therapy.    3. Allergic reaction, initial encounter    Patient states that if she does not takes Zyrtec regularly, she develops itching in her hands and feet. This has been occurring for the past 2 years.    4. Common wart    Patient has 4 small warts: 2 on each hand that she would like to have removed today.        Patient Active Problem List    Diagnosis Date Noted   • Allergic response 02/15/2018   • Common wart 02/15/2018   • Chronic right shoulder pain 08/03/2017   • Anxiety 08/03/2017        Allergies:Patient has no known allergies.    Current meds including changes today  Current Outpatient Prescriptions   Medication Sig Dispense Refill   • cefUROXime (CEFTIN) 500 MG Tab Take 1 Tab by mouth 2 times a day for 10 days. 20 Tab 0   • sertraline (ZOLOFT) 50 MG Tab Take 1 Tab by mouth every day. 90 Tab 3   • Melatonin 5 MG Cap Take  by mouth at bedtime as needed.     • Acetaminophen (TYLENOL PO) Take 650 mg by mouth as needed.     • Multiple Vitamins-Minerals (MULTIVITAMIN ADULT PO) Take  by mouth every day.     • Cetirizine HCl (ZYRTEC ALLERGY PO) Take  by mouth.       No current  "facility-administered medications for this visit.      Social History   Substance Use Topics   • Smoking status: Never Smoker   • Smokeless tobacco: Never Used   • Alcohol use 6.0 oz/week     10 Standard drinks or equivalent per week      Comment: 2-10 per week     Social History     Social History Narrative   • No narrative on file       Family History   Problem Relation Age of Onset   • Arthritis Mother 40     rheumatoid   • Hypertension Father    • Lung Disease Brother      asthma   • Cancer Maternal Grandmother      breast, pancreatic   • Psychiatry Maternal Grandfather      dementia   • Heart Disease Paternal Grandmother    • Cancer Paternal Grandfather      leukemia   • Lung Disease Brother      asthma       Review of Systems:  No chest pain, No shortness of breath, No dyspnea on exertion  Gastrointestinal ROS: No abdominal pain, No nausea, vomiting, diarrhea, or constipation        Exam:      Blood pressure 108/76, pulse 72, temperature 36.5 °C (97.7 °F), resp. rate 12, height 1.676 m (5' 6\"), weight 66 kg (145 lb 8.1 oz), last menstrual period 01/20/2018, SpO2 97 %, not currently breastfeeding.  General:  Well nourished, well developed female in NAD affect and mood within normal limits  Head is grossly normal.  Neck: Supple without adenopathy  Pulmonary: Clear to ausculation.  Normal effort. No rales, rhonchi, or wheezing.  Cardiovascular: Regular rate and rhythm without murmur.   Extremities: no clubbing, cyanosis, or edema. Right arm in shoulder sling and immobilizer  Neuro: moves all extremities symmetrically    Please note that this dictation was created using voice recognition software. I have made every reasonable attempt to correct obvious errors, but I expect that there are errors of grammar and possibly content that I did not discover before finalizing the note.    Assessment/Plan:  1. Establishing care with new doctor, encounter for    Patient had labs done for healthy tracks in August, her blood " sugar and cholesterol were within normal limits. She is up-to-date with her Pap smears.    2. Chronic right shoulder pain        3. Allergic reaction, initial encounter      - REFERRAL TO ALLERGY    4. Common wart    Liquid nitrogen was applied topically, patient tolerated well.    Followup: No Follow-up on file.

## 2018-04-25 ENCOUNTER — OFFICE VISIT (OUTPATIENT)
Dept: MEDICAL GROUP | Facility: MEDICAL CENTER | Age: 33
End: 2018-04-25
Payer: COMMERCIAL

## 2018-04-25 ENCOUNTER — HOSPITAL ENCOUNTER (OUTPATIENT)
Facility: MEDICAL CENTER | Age: 33
End: 2018-04-25
Attending: PHYSICIAN ASSISTANT
Payer: COMMERCIAL

## 2018-04-25 VITALS
BODY MASS INDEX: 23.72 KG/M2 | HEART RATE: 80 BPM | WEIGHT: 147.6 LBS | OXYGEN SATURATION: 96 % | RESPIRATION RATE: 16 BRPM | SYSTOLIC BLOOD PRESSURE: 110 MMHG | DIASTOLIC BLOOD PRESSURE: 70 MMHG | HEIGHT: 66 IN

## 2018-04-25 DIAGNOSIS — B07.8 OTHER VIRAL WARTS: ICD-10-CM

## 2018-04-25 DIAGNOSIS — Z11.3 SCREENING EXAMINATION FOR STD (SEXUALLY TRANSMITTED DISEASE): ICD-10-CM

## 2018-04-25 DIAGNOSIS — B07.8 COMMON WART: ICD-10-CM

## 2018-04-25 PROCEDURE — 87491 CHLMYD TRACH DNA AMP PROBE: CPT

## 2018-04-25 PROCEDURE — 99212 OFFICE O/P EST SF 10 MIN: CPT | Mod: 25 | Performed by: PHYSICIAN ASSISTANT

## 2018-04-25 PROCEDURE — 87480 CANDIDA DNA DIR PROBE: CPT

## 2018-04-25 PROCEDURE — 87510 GARDNER VAG DNA DIR PROBE: CPT

## 2018-04-25 PROCEDURE — 87660 TRICHOMONAS VAGIN DIR PROBE: CPT

## 2018-04-25 PROCEDURE — 87591 N.GONORRHOEAE DNA AMP PROB: CPT

## 2018-04-25 PROCEDURE — 17110 DESTRUCTION B9 LES UP TO 14: CPT | Performed by: PHYSICIAN ASSISTANT

## 2018-04-25 NOTE — ASSESSMENT & PLAN NOTE
Patient would like STD testing. Found out  cheated on her. They have been monogamous for 16 years. Patient with no previous STD. No current symptoms. No known prior testing. Last gyn exam few month ago. Last pap 1-2 years ago. Has IUD - placed a little over one year ago. His exposure around 3 months ago. Pt's exposure to him 3wks to 3 months, multiple unprotected episodes.

## 2018-04-27 ENCOUNTER — HOSPITAL ENCOUNTER (OUTPATIENT)
Dept: LAB | Facility: MEDICAL CENTER | Age: 33
End: 2018-04-27
Attending: PHYSICIAN ASSISTANT
Payer: COMMERCIAL

## 2018-04-27 DIAGNOSIS — Z11.3 SCREENING EXAMINATION FOR STD (SEXUALLY TRANSMITTED DISEASE): ICD-10-CM

## 2018-04-27 LAB
C TRACH DNA SPEC QL NAA+PROBE: NEGATIVE
CANDIDA DNA VAG QL PROBE+SIG AMP: NEGATIVE
G VAGINALIS DNA VAG QL PROBE+SIG AMP: POSITIVE
HIV 1+2 AB+HIV1 P24 AG SERPL QL IA: NON REACTIVE
N GONORRHOEA DNA SPEC QL NAA+PROBE: NEGATIVE
SPECIMEN SOURCE: NORMAL
T VAGINALIS DNA VAG QL PROBE+SIG AMP: NEGATIVE
TREPONEMA PALLIDUM IGG+IGM AB [PRESENCE] IN SERUM OR PLASMA BY IMMUNOASSAY: NON REACTIVE

## 2018-04-27 PROCEDURE — 86694 HERPES SIMPLEX NES ANTBDY: CPT

## 2018-04-27 PROCEDURE — 87389 HIV-1 AG W/HIV-1&-2 AB AG IA: CPT

## 2018-04-27 PROCEDURE — 36415 COLL VENOUS BLD VENIPUNCTURE: CPT

## 2018-04-27 PROCEDURE — 86780 TREPONEMA PALLIDUM: CPT

## 2018-04-27 NOTE — ASSESSMENT & PLAN NOTE
bilat hands. Had a few frozen before but they returned. Would like to try again. Has also tried otc remidies with no benefit.

## 2018-04-27 NOTE — PROGRESS NOTES
"Subjective:   Kendra Perez is a 33 y.o. female here today for same day access for STD screening and warts    Screening examination for STD (sexually transmitted disease)  Patient would like STD testing. Found out  cheated on her. They have been monogamous for 16 years. Patient with no previous STD. No current symptoms. No known prior testing. Last gyn exam few month ago. Last pap 1-2 years ago. Has IUD - placed a little over one year ago. His exposure around 3 months ago. Pt's exposure to him 3wks to 3 months, multiple unprotected episodes.    Common wart  bilat hands. Had a few frozen before but they returned. Would like to try again. Has also tried otc remidies with no benefit.       Current medicines (including changes today)  Current Outpatient Prescriptions   Medication Sig Dispense Refill   • sertraline (ZOLOFT) 50 MG Tab Take 1 Tab by mouth every day. 90 Tab 3   • Melatonin 5 MG Cap Take  by mouth at bedtime as needed.     • Acetaminophen (TYLENOL PO) Take 650 mg by mouth as needed.     • Multiple Vitamins-Minerals (MULTIVITAMIN ADULT PO) Take  by mouth every day.     • Cetirizine HCl (ZYRTEC ALLERGY PO) Take  by mouth.       No current facility-administered medications for this visit.      She  has a past medical history of Anxiety disorder; Cold (01/15/2018); Seasonal allergies; and Shoulder tendonitis.    ROS   No fever/chills. No weight change. No headache/dizziness. No focal weakness. No sore throat, nasal congestion, ear pain. No chest pain, no shortness of breath, difficulty breathing. No n/v/d/c or abdominal pain. No urinary complaint. No joint pain or swelling.       Objective:     Blood pressure 110/70, pulse 80, resp. rate 16, height 1.676 m (5' 6\"), weight 67 kg (147 lb 9.6 oz), last menstrual period 04/11/2018, SpO2 96 %. Body mass index is 23.82 kg/m².  Physical Exam:  Constitutional: WDWN, NAD  Skin: Warm, dry, good turgor, no rashes in visible areas.multiple small flat warts bilat hands " and fingers  Abdomen: Soft, non-tender, no masses, no hepatosplenomegaly.   exam normal. No visible lesions, erythema, discharge.   Psych: Alert and oriented x3, normal affect and mood.        Assessment and Plan:   The following treatment plan was discussed    1. Screening examination for STD (sexually transmitted disease)    - CHLAMYDIA/GC PCR URINE OR SWAB; Future  - RPR (SYPHILIS); Future  - HIV AG/AB COMBO ASSAY SCREENING; Future  - VAGINAL PATHOGENS DNA PANEL; Future  - HSV I/II IGG & IGM SERUM; Future    2. Other viral warts      3. Common wart    Discussed risk/benefit and side effects. 3 cycles of 10 second cryotherapy applied to warts. After care instructions given.      Followup: will contact patient with results

## 2018-05-01 LAB
HSV1+2 IGG SER IA-ACNC: 0.35 IV
HSV1+2 IGM SER IA-ACNC: 0.4 IV

## 2018-05-02 ENCOUNTER — TELEPHONE (OUTPATIENT)
Dept: MEDICAL GROUP | Facility: MEDICAL CENTER | Age: 33
End: 2018-05-02

## 2018-05-02 NOTE — TELEPHONE ENCOUNTER
----- Message from Alda Chavira P.A.-C. sent at 5/2/2018 12:07 PM PDT -----  Please verify patient receives results. Thanks! [facility]

## 2018-05-07 ENCOUNTER — TELEPHONE (OUTPATIENT)
Dept: MEDICAL GROUP | Facility: MEDICAL CENTER | Age: 33
End: 2018-05-07

## 2018-05-07 DIAGNOSIS — B96.89 GARDNERELLA ASSOCIATED VAGINAL DISCHARGE: ICD-10-CM

## 2018-05-07 DIAGNOSIS — N76.0 GARDNERELLA ASSOCIATED VAGINAL DISCHARGE: ICD-10-CM

## 2018-05-07 RX ORDER — METRONIDAZOLE 375 MG/1
375 CAPSULE ORAL 2 TIMES DAILY
Qty: 14 CAP | Refills: 0 | Status: SHIPPED | OUTPATIENT
Start: 2018-05-07 | End: 2018-05-14

## 2018-05-08 NOTE — TELEPHONE ENCOUNTER
Patient would need to be seen here in the office or contact her gynecologist in order to be treated for vaginitis.

## 2018-08-09 ENCOUNTER — DOCUMENTATION (OUTPATIENT)
Dept: OCCUPATIONAL MEDICINE | Facility: CLINIC | Age: 33
End: 2018-08-09

## 2018-08-29 ENCOUNTER — HOSPITAL ENCOUNTER (OUTPATIENT)
Dept: LAB | Facility: MEDICAL CENTER | Age: 33
End: 2018-08-29
Payer: COMMERCIAL

## 2018-08-29 LAB
BDY FAT % MEASURED: 27 %
BP DIAS: 64 MMHG
BP SYS: 113 MMHG
CHOLEST SERPL-MCNC: 145 MG/DL (ref 100–199)
DIABETES HTDIA: NO
EVENT NAME HTEVT: NORMAL
FASTING HTFAS: YES
GLUCOSE SERPL-MCNC: 76 MG/DL (ref 65–99)
HDLC SERPL-MCNC: 55 MG/DL
HYPERTENSION HTHYP: NO
LDLC SERPL CALC-MCNC: 79 MG/DL
SCREENING LOC CITY HTCIT: NORMAL
SCREENING LOC STATE HTSTA: NORMAL
SCREENING LOCATION HTLOC: NORMAL
SMOKING HTSMO: NO
SUBSCRIBER ID HTSID: NORMAL
TRIGL SERPL-MCNC: 57 MG/DL (ref 0–149)

## 2018-08-29 PROCEDURE — 36415 COLL VENOUS BLD VENIPUNCTURE: CPT

## 2018-08-29 PROCEDURE — 82947 ASSAY GLUCOSE BLOOD QUANT: CPT

## 2018-08-29 PROCEDURE — 80061 LIPID PANEL: CPT

## 2018-08-29 PROCEDURE — S5190 WELLNESS ASSESSMENT BY NONPH: HCPCS

## 2018-09-05 ENCOUNTER — TELEPHONE (OUTPATIENT)
Dept: MEDICAL GROUP | Facility: MEDICAL CENTER | Age: 33
End: 2018-09-05

## 2018-09-05 DIAGNOSIS — B07.8 OTHER VIRAL WARTS: ICD-10-CM

## 2018-09-05 NOTE — TELEPHONE ENCOUNTER
Pt called requesting if you can place a referral to dermatology. Requesting it to be sent to [facility].  please advise.

## 2018-09-27 ENCOUNTER — EH NON-PROVIDER (OUTPATIENT)
Dept: OCCUPATIONAL MEDICINE | Facility: CLINIC | Age: 33
End: 2018-09-27

## 2018-09-27 DIAGNOSIS — Z02.89 ENCOUNTER FOR OCCUPATIONAL HEALTH EXAMINATION INVOLVING RESPIRATOR: Primary | ICD-10-CM

## 2018-09-27 PROCEDURE — 94375 RESPIRATORY FLOW VOLUME LOOP: CPT | Performed by: PREVENTIVE MEDICINE

## 2018-11-21 ENCOUNTER — OFFICE VISIT (OUTPATIENT)
Dept: MEDICAL GROUP | Facility: MEDICAL CENTER | Age: 33
End: 2018-11-21
Payer: COMMERCIAL

## 2018-11-21 VITALS
TEMPERATURE: 99.1 F | HEART RATE: 81 BPM | WEIGHT: 159.6 LBS | DIASTOLIC BLOOD PRESSURE: 62 MMHG | SYSTOLIC BLOOD PRESSURE: 100 MMHG | OXYGEN SATURATION: 96 % | BODY MASS INDEX: 25.65 KG/M2 | RESPIRATION RATE: 16 BRPM | HEIGHT: 66 IN

## 2018-11-21 DIAGNOSIS — F41.9 ANXIETY: ICD-10-CM

## 2018-11-21 DIAGNOSIS — Z23 NEED FOR VACCINATION: ICD-10-CM

## 2018-11-21 DIAGNOSIS — G89.29 CHRONIC BILATERAL LOW BACK PAIN WITH BILATERAL SCIATICA: ICD-10-CM

## 2018-11-21 DIAGNOSIS — M54.42 CHRONIC BILATERAL LOW BACK PAIN WITH BILATERAL SCIATICA: ICD-10-CM

## 2018-11-21 DIAGNOSIS — M54.41 CHRONIC BILATERAL LOW BACK PAIN WITH BILATERAL SCIATICA: ICD-10-CM

## 2018-11-21 PROBLEM — M54.40 CHRONIC BILATERAL LOW BACK PAIN WITH SCIATICA: Status: ACTIVE | Noted: 2018-11-21

## 2018-11-21 PROCEDURE — 90686 IIV4 VACC NO PRSV 0.5 ML IM: CPT | Performed by: FAMILY MEDICINE

## 2018-11-21 PROCEDURE — 90471 IMMUNIZATION ADMIN: CPT | Performed by: FAMILY MEDICINE

## 2018-11-21 PROCEDURE — 99214 OFFICE O/P EST MOD 30 MIN: CPT | Mod: 25 | Performed by: FAMILY MEDICINE

## 2018-11-21 RX ORDER — OXYCODONE HYDROCHLORIDE AND ACETAMINOPHEN 5; 325 MG/1; MG/1
TABLET ORAL
COMMUNITY
End: 2020-01-10

## 2018-11-21 RX ORDER — CEFUROXIME AXETIL 500 MG/1
TABLET ORAL
COMMUNITY
End: 2020-01-10

## 2018-11-21 RX ORDER — IBUPROFEN 800 MG/1
TABLET ORAL
COMMUNITY
End: 2018-11-28

## 2018-11-21 RX ORDER — IMIQUIMOD 12.5 MG/.25G
CREAM TOPICAL
Refills: 3 | COMMUNITY
Start: 2018-09-14 | End: 2020-01-10

## 2018-11-21 RX ORDER — HYDROCODONE BITARTRATE AND ACETAMINOPHEN 5; 325 MG/1; MG/1
TABLET ORAL
COMMUNITY
End: 2020-01-10

## 2018-11-21 RX ORDER — DEXAMETHASONE 4 MG/1
TABLET ORAL
COMMUNITY
End: 2020-01-10

## 2018-11-21 RX ORDER — METRONIDAZOLE 7.5 MG/G
GEL VAGINAL
COMMUNITY
End: 2020-01-10

## 2018-11-21 RX ORDER — IBUPROFEN 200 MG
200 TABLET ORAL EVERY 6 HOURS PRN
COMMUNITY
End: 2018-11-28

## 2018-11-21 NOTE — ASSESSMENT & PLAN NOTE
Start with pt   I would like to consult with physiatry as she has required epidural steroid injection in the past  If in the future the problem becomes work aggravated I explain that best course of action is to see evaluation occ med through her current employer and I provide her with that letter  X ray prior to physiatry consultation  All questions answered    Over 25 minutes spent with patient face to face, greater than 50% time spent with plan/coordination of care regarding that which is discussed in the HPI and A&P

## 2018-11-21 NOTE — LETTER
To whom this may concern -   Kendra Perez was seen and evaluated in clinic today 11/21/18 and has a work aggravated injury/condition. I have recommended that she seek occupational medicine evaluation through her current employer  Best,   Dr. Ashley Felix MD

## 2018-11-21 NOTE — PROGRESS NOTES
This medical record contains text that has been entered with the assistance of computer voice recognition and dictation software.  Therefore, it may contain unintended errors in text, spelling, punctuation, or grammar        Chief Complaint   Patient presents with   • Back Pain     lower pain moved from RT to hip and now on left side too   • Immunizations     flu   • Medication Refill     Zoloft       Kendra Perez is a 33 y.o. 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     • sertraline (ZOLOFT) 50 MG Tab Take 1 Tab by mouth every day. 90 Tab 3   • Multiple Vitamins-Minerals (MULTIVITAMIN ADULT PO) Take  by mouth every day.     • Cetirizine HCl (ZYRTEC ALLERGY PO) Take  by mouth.     • Melatonin 5 MG Cap Take  by mouth at bedtime as needed.     • Acetaminophen (TYLENOL PO) Take 650 mg by mouth as needed.       No current facility-administered medications for this visit.      Patient Active Problem List    Diagnosis Date Noted   • Chronic bilateral low back pain with sciatica 11/21/2018   • Screening examination for STD (sexually transmitted disease) 04/25/2018   • Allergic response 02/15/2018   • Common wart 02/15/2018   • Chronic right shoulder pain 08/03/2017   • Anxiety 08/03/2017     Past Surgical History:   Procedure Laterality Date   • SHOULDER DECOMPRESSION ARTHROSCOPIC Right 1/17/2018    Procedure: SHOULDER DECOMPRESSION ARTHROSCOPIC -  "SUBACROMIAL;  Surgeon: Khadar Pate M.D.;  Location: SURGERY [facility];  Service: Orthopedics   • SHOULDER ARTHROSCOPY W/ ROTATOR CUFF REPAIR  1/17/2018    Procedure: SHOULDER ARTHROSCOPY W/ ROTATOR CUFF REPAIR;  Surgeon: Khadar Pate M.D.;  Location: SURGERY [facility];  Service: Orthopedics   • SHOULDER ARTHROSCOPY W/ BICIPITAL TENODESIS REPAIR  1/17/2018    Procedure: SHOULDER ARTHROSCOPY W/ BICIPITAL TENODESIS REPAIR;  Surgeon: Khadar Pate M.D.;  Location: SURGERY [facility];  Service: Orthopedics   • GANGLION EXCISION Left 2005    From wrist   • LUMPECTOMY Left 2004    benign fibroadenoma to breast   • ADENOIDECTOMY  1990   • DENTAL EXTRACTION(S)  1990's    [place] teeth      Social History   Substance Use Topics   • Smoking status: Never Smoker   • Smokeless tobacco: Never Used   • Alcohol use 6.0 oz/week     10 Standard drinks or equivalent per week      Comment: 2-10 per week     Family History   Problem Relation Age of Onset   • Arthritis Mother 40        rheumatoid   • Hypertension Father    • Lung Disease Brother         asthma   • Cancer Maternal Grandmother         breast, pancreatic   • Psychiatry Maternal Grandfather         dementia   • Heart Disease Paternal Grandmother    • Cancer Paternal Grandfather         leukemia   • Lung Disease Brother         asthma           ROS  No n/v  all review of system completed and negative except for those listed above     Objective:     Blood pressure 100/62, pulse 81, temperature 37.3 °C (99.1 °F), temperature source Temporal, resp. rate 16, height 1.676 m (5' 6\"), weight 72.4 kg (159 lb 9.6 oz), SpO2 96 %, not currently breastfeeding. Body mass index is 25.76 kg/m².  Physical Exam:        GEN: comfortable, alert and oriented, well nourished, well developed, in no apparent distress   HEENT: NCAT, eyes: pupils equal and reactive, sclera white, EOMIT, good dentition  HEART: limbs warm and well perfused, regular rate, no " JVD, no lower extremity edema  LUNGS: speaking in full sentences, not in apparent respiratory distress, no audible wheezes  MSK: normal tone and bulk, no swelling of the joints, gait steady and normal   No midline ttp back   + paraspinal ttp   + bilateral straight leg raise  Lower extremity strength sensation in tact   Reflex normal lower extremity             Assessment and Plan:   The following treatment plan was discussed        Problem List Items Addressed This Visit     Anxiety     Well controlled zoloft   Refill granted   Risk benefit side effect discussed             Relevant Medications    sertraline (ZOLOFT) 50 MG Tab    Chronic bilateral low back pain with sciatica     Start with pt   I would like to consult with physiatry as she has required epidural steroid injection in the past  If in the future the problem becomes work aggravated I explain that best course of action is to see evaluation occ med through her current employer and I provide her with that letter  X ray prior to physiatry consultation  All questions answered    Over 25 minutes spent with patient face to face, greater than 50% time spent with plan/coordination of care regarding that which is discussed in the HPI and A&P           Relevant Medications    ibuprofen (MOTRIN) 200 MG Tab    sertraline (ZOLOFT) 50 MG Tab    Other Relevant Orders    REFERRAL TO PHYSICAL THERAPY Reason for Therapy: Eval/Treat/Report    REFERRAL TO PHYSIATRY (PMR)    DX-LUMBAR SPINE-2 OR 3 VIEWS      Other Visit Diagnoses     Need for vaccination        Relevant Orders    Influenza Vaccine Quad Injection >3Y (PF)                Instructed to follow up if symptoms worsen or fail to improve, ER/UC precautions discussed as well    Ashley Felix MD  [facility], Family Medicine   [address] [place] [address] [address]   [place] [address]  Phone: [phone]

## 2018-11-28 ENCOUNTER — OCCUPATIONAL MEDICINE (OUTPATIENT)
Dept: OCCUPATIONAL MEDICINE | Facility: CLINIC | Age: 33
End: 2018-11-28
Payer: COMMERCIAL

## 2018-11-28 VITALS
BODY MASS INDEX: 25.39 KG/M2 | HEART RATE: 76 BPM | WEIGHT: 158 LBS | TEMPERATURE: 98.5 F | HEIGHT: 66 IN | DIASTOLIC BLOOD PRESSURE: 78 MMHG | SYSTOLIC BLOOD PRESSURE: 112 MMHG | OXYGEN SATURATION: 99 %

## 2018-11-28 DIAGNOSIS — M77.8 RIGHT SHOULDER TENDONITIS: ICD-10-CM

## 2018-11-28 DIAGNOSIS — S39.012D STRAIN OF LUMBAR REGION, SUBSEQUENT ENCOUNTER: ICD-10-CM

## 2018-11-28 DIAGNOSIS — Z02.83 ENCOUNTER FOR DRUG SCREENING: ICD-10-CM

## 2018-11-28 DIAGNOSIS — S16.1XXD STRAIN OF NECK MUSCLE, SUBSEQUENT ENCOUNTER: ICD-10-CM

## 2018-11-28 DIAGNOSIS — Z02.1 PRE-EMPLOYMENT DRUG SCREENING: ICD-10-CM

## 2018-11-28 LAB
AMP AMPHETAMINE: NORMAL
BAR BARBITURATES: NORMAL
BREATH ALCOHOL COMMENT: NORMAL
BZO BENZODIAZEPINES: NORMAL
COC COCAINE: NORMAL
INT CON NEG: NORMAL
INT CON POS: NORMAL
MDMA ECSTASY: NORMAL
MET METHAMPHETAMINES: NORMAL
MTD METHADONE: NORMAL
OPI OPIATES: NORMAL
OXY OXYCODONE: NORMAL
PCP PHENCYCLIDINE: NORMAL
POC BREATHALIZER: 0 PERCENT (ref 0–0.01)
POC URINE DRUG SCREEN OCDRS: NORMAL
THC: NORMAL

## 2018-11-28 PROCEDURE — 82075 ASSAY OF BREATH ETHANOL: CPT | Performed by: PREVENTIVE MEDICINE

## 2018-11-28 PROCEDURE — 80305 DRUG TEST PRSMV DIR OPT OBS: CPT | Performed by: PREVENTIVE MEDICINE

## 2018-11-28 PROCEDURE — 99204 OFFICE O/P NEW MOD 45 MIN: CPT | Performed by: PREVENTIVE MEDICINE

## 2018-11-28 RX ORDER — TIZANIDINE 4 MG/1
4 TABLET ORAL
Qty: 20 TAB | Refills: 0 | Status: SHIPPED | OUTPATIENT
Start: 2018-11-28 | End: 2018-12-19

## 2018-11-28 RX ORDER — PREDNISONE 20 MG/1
40 TABLET ORAL DAILY
Qty: 14 TAB | Refills: 0 | Status: SHIPPED | OUTPATIENT
Start: 2018-11-28 | End: 2018-12-05

## 2018-11-28 RX ORDER — DICLOFENAC SODIUM 75 MG/1
75 TABLET, DELAYED RELEASE ORAL 2 TIMES DAILY
Qty: 60 TAB | Refills: 0 | Status: SHIPPED | OUTPATIENT
Start: 2018-11-28 | End: 2020-01-10

## 2018-11-28 NOTE — LETTER
"   [address],   [address] [place] [address]  Phone:  [phone] - Fax:  [phone]   Occupational Health [facility] Progress Report and Disability Certification  Date of Service: 11/28/2018   No Show:  No  Date / Time of Next Visit: 12/7/2018 @ 3:15 PM   Claim Information   Patient Name: Kendra Perez  Claim Number:     Employer: [employer]  Date of Injury: 11/20/2018     Insurer / TPA: [employer]  ID / SSN:     Occupation: Registered Nurse  Diagnosis: Diagnoses of Strain of lumbar region, subsequent encounter, Strain of neck muscle, subsequent encounter, Right shoulder tendonitis, Encounter for drug screening, and Pre-employment drug screening were pertinent to this visit.    Medical Information   Related to Industrial Injury?   Comments:yes - back shoulder- indeterminate    Subjective Complaints:  DOI 11/20/2018: 34 yo female presents with low back pain. 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 She also noted increase in right shoulder pain.  She states the pain in the back is sharp at times.  She notes some tingling between the shoulder blades as well as occasional tingling down both legs, more so on the left.  She has been taking ibuprofen without relief.  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  She was seen by her primary care physician 11/21 and advised to come to the occupational health clinic.  She states the shoulder pain is similar to what it was before her surgery but has good range of motion.  She had a labral repair back in January 2018, and had not had much pain in the recent months.   Objective Findings: Cervical/thoracic: No gross deformity.  Diffuse tenderness paraspinal musculature lower " Could increase to 10 mg daily for 1 month   cervical through mid thoracic spine.  Full range of motion with pain.  Lumbar: No gross deformity.  Diffuse tenderness bilateral paraspinal musculature throughout entire lumbar spine.  Slightly limited flexion, with pain with extension and flexion.  Straight leg test positive on left.  Normal gait.  Right shoulder: Diffuse tenderness anterior shoulder.  Full range of motion.  Strength intact.  Neuro: Alert and oriented x3.  Upper and lower extremity reflexes intact.  Upper and lower extremity strength intact.  Sensation intact.   Pre-Existing Condition(s): Occasional low back pain over past few months. Low back pain 10+ years ago with epidural injection. No injections or treatments since   Recent right shoulder labral repair 1/2018   Assessment:   Initial Visit    Status: Additional Care Required  Permanent Disability:No    Plan:      Diagnostics:      Comments:  Given failure of OTC medications will prescribe prednisone 40 mg x 7 days followed by diclofenac  Prescribe tizanidine to use at night  Referral to physical therapy  Restricted duty  Follow-up 1.5 weeks    Disability Information   Status: Released to Restricted Duty    From:  11/28/2018  Through: 12/7/2018 Restrictions are: Temporary   Physical Restrictions   Sitting:    Standing:    Stooping:  < or = to 1 hr/day Bending:  < or = to 1 hr/day   Squatting:    Walking:    Climbing:    Pushing:      Pulling:    Other:    Reaching Above Shoulder (L):   Reaching Above Shoulder (R):       Reaching Below Shoulder (L):    Reaching Below Shoulder (R):      Not to exceed Weight Limits   Carrying(hrs):   Weight Limit(lb): < or = to 10 pounds Lifting(hrs):   Weight  Limit(lb): < or = to 10 pounds   Comments:      Repetitive Actions   Hands: i.e. Fine Manipulations from Grasping:     Feet: i.e. Operating Foot Controls:     Driving / Operate Machinery:     Physician Name: Frankie Felix D.O. Physician Signature: [email or web address] SCOTT D.O. e-Signature: Dr. Irving Hughes,  Medical Director   Clinic Name / Location: [address],   [address]  [place] [address] Clinic Phone Number: Dept: [phone]   Appointment Time: 8:15 Am Visit Start Time: 8:36 AM   Check-In Time:  8:26 Am Visit Discharge Time:  10:35 PM   Original-Treating Physician or Chiropractor    Page 2-Insurer/TPA    Page 3-Employer    Page 4-Employee

## 2018-11-28 NOTE — PROGRESS NOTES
"Subjective:      Kendra Perez is a 33 y.o. female who presents with Other (DOi - Back R shoulder)      DOI 11/20/2018: 32 yo female presents with low back pain. 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 She also noted increase in right shoulder pain.  She states the pain in the back is sharp at times.  She notes some tingling between the shoulder blades as well as occasional tingling down both legs, more so on the left.  She has been taking ibuprofen without relief.  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  She was seen by her primary care physician 11/21 and advised to come to the occupational health clinic.  She states the shoulder pain is similar to what it was before her surgery but has good range of motion.  She had a labral repair back in January 2018, and had not had much pain in the recent months.     HPI    ROS  ROS: All systems were reviewed on intake form, form was reviewed and signed. See scanned documents in media. Pertinent positives and negatives included in HPI.    PMH: No pertinent past medical history to this problem  MEDS: Medications were reviewed in Epic  ALLERGIES: No Known Allergies  SOCHX: Works as an RN at [employer]  FH: No pertinent family history to this problem     Objective:     /78   Pulse 76   Temp 36.9 °C (98.5 °F)   Ht 1.676 m (5' 6\")   Wt 71.7 kg (158 lb)   SpO2 99%   BMI 25.50 kg/m²      Physical Exam   Constitutional: She appears well-developed and well-nourished.   HENT:   Right Ear: External ear normal.   Left Ear: External ear normal.   Eyes: Conjunctivae and EOM are normal.   Cardiovascular: Normal rate.    Pulmonary/Chest: Effort normal.   Skin: Skin is warm and dry.   Psychiatric: She has a normal mood and affect. " Judgment normal.       Cervical/thoracic: No gross deformity.  Diffuse tenderness paraspinal musculature lower cervical through mid thoracic spine.  Full range of motion with pain.  Lumbar: No gross deformity.  Diffuse tenderness bilateral paraspinal musculature throughout entire lumbar spine.  Slightly limited flexion, with pain with extension and flexion.  Straight leg test positive on left.  Normal gait.  Right shoulder: Diffuse tenderness anterior shoulder.  Full range of motion.  Strength intact.  Neuro: Alert and oriented x3.  Upper and lower extremity reflexes intact.  Upper and lower extremity strength intact.  Sensation intact.       Assessment/Plan:     1. Strain of lumbar region, subsequent encounter    2. Strain of neck muscle, subsequent encounter    3. Right shoulder tendonitis    Given failure of OTC medications will prescribe prednisone 40 mg x 7 days followed by diclofenac  Prescribe tizanidine to use at night  Referral to physical therapy  Restricted duty  Follow-up 1.5 weeks

## 2018-11-28 NOTE — LETTER
"EMPLOYEE’S CLAIM FOR COMPENSATION/ REPORT OF INITIAL TREATMENT  FORM C-4    EMPLOYEE’S CLAIM - PROVIDE ALL INFORMATION REQUESTED   First Name  Kendra Last Name  Chris Birthdate                    1985                Sex  female Claim Number   Home Address  [address]: [id number] Age  33 y.o. 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 (if applicable)  Transfering a patient from gurney to bed    How did this injury or occupational disease occur? (Be specific an answer in detail. Use additional sheet if necessary)  I was transferring a [place] to bed via a slideboard with other employes and the patient was very heavy and inmobile so was very difficult.    If you believe that you have an occupational disease, when did you first have knowledge of the disability and it relationship to your employment?  N/A Witnesses to the Accident  Gracy Hernandez      Nature of Injury or Occupational Disease  Strain  Part(s) of Body Injured or Affected  Lower Back Area (Lumbar Area & Lumbo-Sacral), " Shoulder (R),     I certify that the above is true and correct to the best of my knowledge and that I have provided this information in order to obtain the benefits of [place]  I hereby authorize any physician, chiropractor, surgeon, practitioner, or other person, any hospital, including [facility] or [facility], any medical service organization, any insurance company, or other institution or organization to release to each other, any medical or other information, including benefits paid or payable, pertinent to this injury or disease, except information relative to diagnosis, treatment and/or counseling for AIDS, psychological conditions, alcohol or controlled substances, for which I must give specific authorization.  A Photostat of this authorization shall be as valid as the original.     Date   Place   Employee’s Signature   THIS REPORT MUST BE COMPLETED AND MAILED WITHIN 3 WORKING DAYS OF TREATMENT   Place  [facility]  Name of [facility]   Date  11/28/2018 Diagnosis  (S39.012D) Strain of lumbar region, subsequent encounter  (S16.1XXD) Strain of neck muscle, subsequent encounter  (M75.81) Right shoulder tendonitis  (Z02.83) Encounter for drug screening  (Z02.1) Pre-employment drug screening Is there evidence the injured employee was under the influence of alcohol and/or another controlled substance at the time of accident?   Hour  8:36 AM Description of Injury or Disease  Diagnoses of Strain of lumbar region, subsequent encounter, Strain of neck muscle, subsequent encounter, Right shoulder tendonitis, Encounter for drug screening, and Pre-employment drug screening were pertinent to this visit. No   Treatment  Diclofenac, tizanidine, prednisone and PT  Have you advised the patient to remain off work five days or more? No   X-Ray Findings      If Yes   From  "Date  To Date      From information given by the employee, together with medical evidence, can you directly connect this injury or occupational disease as job incurred?    Comments:back/neck - yes; right shoulder-indetermiante If No Full Duty  No Modified Duty  Yes   Is additional medical care by a physician indicated?  Yes If Modified Duty, Specify any Limitations / Restrictions  <10 lbs lift/push/pull   Do you know of any previous injury or disease contributing to this condition or occupational disease?                            Yes  Comments:recent right shoulder labral repair 1/2018, hx of chronic \"lbp\" no treatments in 10 years   Date  11/28/2018 Print Doctor’s Name Frankie Felix D.O. I certify the employer’s copy of  this form was mailed on:   Address  [address] [place] [address],   [address] Insurer’s Use Only     [facility]  [address]    Provider’s Tax ID Number  [id number] Telephone  Dept: [phone]        e-SignTAYLORFRANKIE D.O.   e-Signature: Dr. Irving Hughes, Medical Director Degree  DO        ORIGINAL-TREATING PHYSICIAN OR CHIROPRACTOR    PAGE 2-INSURER/TPA    PAGE 3-EMPLOYER    PAGE 4-EMPLOYEE             Form C-4 (rev10/07)              BRIEF DESCRIPTION OF RIGHTS AND BENEFITS  (Pursuant to [place] 616C.050)    Notice of Injury or Occupational Disease (Incident Report Form C-1): If an injury or occupational disease (OD) arises out of and in the  course of employment, you must provide written notice to your employer as soon as practicable, but no later than 7 days after the accident or  OD. Your employer shall maintain a sufficient supply of the required forms.    Claim for Compensation (Form C-4): If medical treatment is sought, the form C-4 is available at the place of initial treatment. A completed  \"Claim for Compensation\" (Form C-4) must be filed within 90 days after an accident or OD. The treating physician or chiropractor must,  within 3 working days after treatment, complete and mail to the " employer, the employer's insurer and third-party , the Claim for  Compensation.    Medical Treatment: If you require medical treatment for your on-the-job injury or OD, you may be required to select a physician or  chiropractor from a list provided by your workers’ compensation insurer, if it has contracted with an Organization for Managed Care (MCO) or  Preferred Provider Organization (PPO) or providers of health care. If your employer has not entered into a contract with an MCO or PPO, you  may select a physician or chiropractor from the Panel of Physicians and Chiropractors. Any medical costs related to your industrial injury or  OD will be paid by your insurer.    Temporary Total Disability (TTD): If your doctor has certified that you are unable to work for a period of at least 5 consecutive days, or 5  cumulative days in a 20-day period, or places restrictions on you that your employer does not accommodate, you may be entitled to TTD  compensation.    Temporary Partial Disability (TPD): If the wage you receive upon reemployment is less than the compensation for TTD to which you are  entitled, the insurer may be required to pay you TPD compensation to make up the difference. TPD can only be paid for a maximum of 24  months.    Permanent Partial Disability (PPD): When your medical condition is stable and there is an indication of a PPD as a result of your injury or  OD, within 30 days, your insurer must arrange for an evaluation by a rating physician or chiropractor to determine the degree of your PPD. The  amount of your PPD award depends on the date of injury, the results of the PPD evaluation and your age and wage.    Permanent Total Disability (PTD): If you are medically certified by a treating physician or chiropractor as permanently and totally disabled  and have been granted a PTD status by your insurer, you are entitled to receive monthly benefits not to exceed 66 2/3% of your  average  monthly wage. The amount of your PTD payments is subject to reduction if you previously received a PPD award.    Vocational Rehabilitation Services: You may be eligible for vocational rehabilitation services if you are unable to return to the job due to a  permanent physical impairment or permanent restrictions as a result of your injury or occupational disease.    Transportation and Per Jesus Alberto Reimbursement: You may be eligible for travel expenses and per jesus alberto associated with medical treatment.    Reopening: You may be able to reopen your claim if your condition worsens after claim closure.    Appeal Process: If you disagree with a written determination issued by the insurer or the insurer does not respond to your request, you may  appeal to the Department of Administration, , by following the instructions contained in your determination letter. You must  appeal the determination within 70 days from the date of the determination letter at [address], [address], [place], [place]  [address], or [address], [address], [place] [address]. If you disagree with the  decision, you may appeal to the  Department of Administration, . You must file your appeal within 30 days from the date of the  decision  letter at [address], [address], [place], [place] [address], or [address], [address], [place] [address]. If you  disagree with a decision of an , you may file a petition for judicial review with the District Court. You must do so within 30  days of the Appeal Officer’s decision. You may be represented by an  at your own expense or you may contact the [facility] for possible  representation.    [place]  for Injured Workers (NAIW): If you disagree with a  decision, you may request that NAIW represent you  without charge at an  Hearing. For information  regarding denial of benefits, you may contact the [facility] at: [address], [address], [place] [address], [phone], or [address], [address], [place] [address], [phone]    To File a Complaint with the Division: If you wish to file a complaint with the  of the Division of Industrial Relations (DIR),  please contact the Workers’ Compensation Section, [address], [address], [place], [place] [address], telephone ([phone]) [phone], or  [address], [address], [place], [place] [address], telephone [phone].    For assistance with Workers’ Compensation Issues: you may contact the Office of the Governor Consumer Health Assistance, [address] [place] [address], [address], [place], [place] [address], Toll Free 1-961.490.7379, Web site: http://govcha.[facility].nv.us, E-mail  Lillian@[facility].nv.                                                                                                                                                                                                                                   __________________________________________________________________                                                                   _________________                Employee Name / Signature                                                                                                                                                       Date                                                                                                                                                                                                     D-2 (rev. 10/07)

## 2018-12-07 ENCOUNTER — OCCUPATIONAL MEDICINE (OUTPATIENT)
Dept: OCCUPATIONAL MEDICINE | Facility: CLINIC | Age: 33
End: 2018-12-07
Payer: COMMERCIAL

## 2018-12-07 VITALS
OXYGEN SATURATION: 98 % | BODY MASS INDEX: 22.5 KG/M2 | HEIGHT: 66 IN | DIASTOLIC BLOOD PRESSURE: 70 MMHG | HEART RATE: 65 BPM | SYSTOLIC BLOOD PRESSURE: 108 MMHG | WEIGHT: 140 LBS | TEMPERATURE: 97.6 F

## 2018-12-07 DIAGNOSIS — M77.8 RIGHT SHOULDER TENDONITIS: ICD-10-CM

## 2018-12-07 DIAGNOSIS — S39.012D STRAIN OF LUMBAR REGION, SUBSEQUENT ENCOUNTER: ICD-10-CM

## 2018-12-07 DIAGNOSIS — S16.1XXD STRAIN OF NECK MUSCLE, SUBSEQUENT ENCOUNTER: ICD-10-CM

## 2018-12-07 PROCEDURE — 99213 OFFICE O/P EST LOW 20 MIN: CPT | Performed by: PREVENTIVE MEDICINE

## 2018-12-07 ASSESSMENT — PAIN SCALES - GENERAL: PAINLEVEL: 3=SLIGHT PAIN

## 2018-12-07 ASSESSMENT — ENCOUNTER SYMPTOMS
SENSORY CHANGE: 1
TINGLING: 1

## 2018-12-07 NOTE — LETTER
[address],   [address] [place] [address]  Phone:  [phone] - Fax:  [phone]   Occupational Health [facility] Progress Report and Disability Certification  Date of Service: 12/7/2018   No Show:  No  Date / Time of Next Visit: 12/20/2018   Claim Information   Patient Name: Kendra Perez  Claim Number:     Employer: [employer]  Date of Injury: 11/20/2018     Insurer / TPA: Workers Choice  ID / SSN:     Occupation: Registered Nurse  Diagnosis: Diagnoses of Strain of lumbar region, subsequent encounter, Strain of neck muscle, subsequent encounter, and Right shoulder tendonitis were pertinent to this visit.    Medical Information   Related to Industrial Injury?   Comments:back/neck - yes; right shoulder-indetermiante    Subjective Complaints:  DOI 11/20/2018: 32 yo female presents with low back pain. 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 She also noted increase in right shoulder pain.  Patient states overall she feels that her neck and low back pain is about the same.  Gets some tingling in the legs and through the upper back.  She feels the tizanidine does help somewhat but does not feel much benefit from the diclofenac or prednisone.  Physical therapy set to start next Monday.  She states that shoulder pain is somewhat improved but not quite back to baseline.   Objective Findings: Cervical/thoracic: No gross deformity.  Full range of motion with pain.  Lumbar: No gross deformity. Full ROM with pain.   Normal gait.  Right shoulder:  Full range of motion.     Pre-Existing Condition(s):     Assessment:   Condition Same    Status: Additional Care Required  Permanent Disability:No    Plan:      Diagnostics:      Comments:  Begin physical therapy  Continue diclofenac and tizanidine as needed  Continue restricted duty  Follow-up 2 weeks    Disability Information   Status: Released to Restricted Duty      From:  12/7/2018  Through: 12/20/2018 Restrictions are: Temporary   Physical Restrictions   Sitting:    Standing:    Stooping:    Bending:      Squatting:    Walking:    Climbing:    Pushing:      Pulling:    Other:    Reaching Above Shoulder (L):   Reaching Above Shoulder (R):       Reaching Below Shoulder (L):    Reaching Below Shoulder (R):      Not to exceed Weight Limits   Carrying(hrs):   Weight Limit(lb): < or = to 10 pounds Lifting(hrs):   Weight  Limit(lb): < or = to 10 pounds   Comments:      Repetitive Actions   Hands: i.e. Fine Manipulations from Grasping:     Feet: i.e. Operating Foot Controls:     Driving / Operate Machinery:     Physician Name: Frankie Felix D.O. Physician Signature: FRANKIE Schwab D.O. e-Signature: Dr. Irving Hughes, Medical Director   Clinic Name / Location: [address],   [address] [place] [address] Clinic Phone Number: Dept: [phone]   Appointment Time: 3:15 Pm Visit Start Time: 3:01 PM   Check-In Time:  2:59 Pm Visit Discharge Time:  3:23 PM   Original-Treating Physician or Chiropractor    Page 2-Insurer/TPA    Page 3-Employer    Page 4-Employee

## 2018-12-07 NOTE — PROGRESS NOTES
"Subjective:      Kendra Perez is a 33 y.o. female who presents with Follow-Up ( DOI: 11-20-18 BACK -same-)      DOI 11/20/2018: 34 yo female presents with low back pain. 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 She also noted increase in right shoulder pain.  Patient states overall she feels that her neck and low back pain is about the same.  Gets some tingling in the legs and through the upper back.  She feels the tizanidine does help somewhat but does not feel much benefit from the diclofenac or prednisone.  Physical therapy set to start next Monday.  She states that shoulder pain is somewhat improved but not quite back to baseline.     HPI    Review of Systems   Skin: Negative for itching and rash.   Neurological: Positive for tingling and sensory change.     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     Objective:     /70 (BP Location: Right arm, Patient Position: Sitting)   Pulse 65   Temp 36.4 °C (97.6 °F) (Temporal)   Ht 1.676 m (5' 6\")   Wt 63.5 kg (140 lb)   SpO2 98%   BMI 22.60 kg/m²      Physical Exam   Constitutional: She is oriented to person, place, and time. She appears well-developed and well-nourished.   Cardiovascular: Normal rate.    Pulmonary/Chest: Effort normal.   Neurological: She is alert and oriented to person, place, and time.   Skin: Skin is warm and dry.   Psychiatric: She has a normal mood and affect. Judgment normal.       Cervical/thoracic: No gross deformity.  Full range of motion with pain.  Lumbar: No gross deformity. Full ROM with pain.   Normal gait.  Right shoulder:  Full range of motion.         Assessment/Plan:     1. Strain of lumbar region, subsequent encounter  2. Strain of neck muscle, subsequent encounter  3. Right shoulder tendonitis    Begin physical therapy  Continue diclofenac and tizanidine as needed  Continue restricted duty  Follow-up 2 weeks      "

## 2018-12-07 NOTE — LETTER
[address],   [address] [place] [address]  Phone:  [phone] - Fax:  [phone]   Occupational Health [facility] Progress Report and Disability Certification  Date of Service: 12/7/2018   No Show:  No  Date / Time of Next Visit: 12/20/2018   Claim Information   Patient Name: Kendra Perez  Claim Number:     Employer: [employer] Date of Injury: 11/20/2018     Insurer / TPA: Workers Choice  ID / SSN:     Occupation: Registered Nurse  Diagnosis: Diagnoses of Strain of lumbar region, subsequent encounter, Strain of neck muscle, subsequent encounter, and Right shoulder tendonitis were pertinent to this visit.    Medical Information   Related to Industrial Injury?   Comments:back/neck - yes; right shoulder-indetermiante    Subjective Complaints:  DOI 11/20/2018: 32 yo female presents with low back pain. 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 She also noted increase in right shoulder pain.  Patient states overall she feels that her neck and low back pain is about the same.  Gets some tingling in the legs and through the upper back.  She feels the tizanidine does help somewhat but does not feel much benefit from the diclofenac or prednisone.  Physical therapy set to start next Monday.  She states that shoulder pain is somewhat improved but not quite back to baseline.   Objective Findings: Cervical/thoracic: No gross deformity.  Full range of motion with pain.  Lumbar: No gross deformity. Full ROM with pain.   Normal gait.  Right shoulder:  Full range of motion.     Pre-Existing Condition(s):     Assessment:   Condition Same    Status: Additional Care Required  Permanent Disability:No    Plan:      Diagnostics:      Comments:  Begin physical therapy  Continue diclofenac and tizanidine as needed  Continue restricted duty  Follow-up 2 weeks    Disability Information   Status: Released to Restricted Duty       From:  12/7/2018  Through: 12/20/2018 Restrictions are: Temporary   Physical Restrictions   Sitting:    Standing:    Stooping:  < or = to 1 hr/day Bending:  < or = to 1 hr/day   Squatting:    Walking:    Climbing:    Pushing:      Pulling:    Other:    Reaching Above Shoulder (L):   Reaching Above Shoulder (R):       Reaching Below Shoulder (L):    Reaching Below Shoulder (R):      Not to exceed Weight Limits   Carrying(hrs):   Weight Limit(lb): < or = to 10 pounds Lifting(hrs):   Weight  Limit(lb): < or = to 10 pounds   Comments: CORRECTED 12/19/18: Should have included restrictions on bending and stooping    Repetitive Actions   Hands: i.e. Fine Manipulations from Grasping:     Feet: i.e. Operating Foot Controls:     Driving / Operate Machinery:     Physician Name: Frankie Felix D.O. Physician Signature: [email or web address] NI D.O. e-Signature: Dr. Irving Hughes, Medical Director   Clinic Name / Location: [address],   [address] [place] [address] Clinic Phone Number: Dept: [phone]   Appointment Time: 3:15 Pm Visit Start Time: 3:01 PM   Check-In Time:  2:59 Pm Visit Discharge Time: 3:24 Pm    Original-Treating Physician or Chiropractor    Page 2-Insurer/TPA    Page 3-Employer    Page 4-Employee

## 2018-12-10 ENCOUNTER — PHYSICAL THERAPY (OUTPATIENT)
Dept: PHYSICAL THERAPY | Facility: REHABILITATION | Age: 33
End: 2018-12-10
Attending: PREVENTIVE MEDICINE
Payer: COMMERCIAL

## 2018-12-10 DIAGNOSIS — S16.1XXD NECK MUSCLE STRAIN, SUBSEQUENT ENCOUNTER: ICD-10-CM

## 2018-12-10 DIAGNOSIS — S39.012D LUMBAR SPINE STRAIN, SUBSEQUENT ENCOUNTER: ICD-10-CM

## 2018-12-10 DIAGNOSIS — M77.8 TENDINITIS OF RIGHT SHOULDER: ICD-10-CM

## 2018-12-10 PROCEDURE — 97110 THERAPEUTIC EXERCISES: CPT

## 2018-12-10 PROCEDURE — 97140 MANUAL THERAPY 1/> REGIONS: CPT

## 2018-12-10 PROCEDURE — 97162 PT EVAL MOD COMPLEX 30 MIN: CPT

## 2018-12-10 ASSESSMENT — ENCOUNTER SYMPTOMS
EXACERBATED BY: SITTING
QUALITY: SHARP
EXACERBATED BY: ACTIVITY
PAIN TIMING: WHEN ACTIVE
EXACERBATED BY: BENDING
EXACERBATED BY: WALKING
QUALITY: NUMBNESS
PAIN SCALE: 4
QUALITY: ACHING
PAIN SCALE AT HIGHEST: 8
PAIN SCALE AT LOWEST: 4
ALLEVIATING FACTORS: REST

## 2018-12-10 NOTE — OP THERAPY EVALUATION
Outpatient Physical Therapy  INITIAL EVALUATION    [facility] Physical Therapy [place] [id number] [employer] [facility], [address]  [place] [address]  Phone:  [phone]    Date of Evaluation: 12/10/2018    Patient: Kendra Perez  YOB: 1985  MRN: [id number]     Referring Provider: Frankie Felix D.O.  [address] [place] [address]  [place] [address]   Referring Diagnosis Strain of lumbar region, subsequent encounter [S39.012D];Strain of neck muscle, subsequent encounter [S16.1XXD];Right shoulder tendonitis [M75.81]     Time Calculation  Start time: 930  Stop time: 1030 Time Calculation (min): 60 minutes     Physical Therapy Occurrence Codes    Date of onset of impairment:  18   Date physical therapy care plan established or reviewed:  12/10/18   Date physical therapy treatment started:  12/10/18          Chief Complaint: Back Problem    Visit Diagnoses     ICD-10-CM   1. Lumbar spine strain, subsequent encounter S39.012D   2. Neck muscle strain, subsequent encounter S16.1XXD   3. 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 She has also had tingling across bilateral upper trap areas. She has been on light duty since then. Significantly less symptoms after taking a few days off from work. Sneezing is extremely aggravating to symptoms.    Walking increases low back pain; sitting increases upper back parasthesias.    Hx of R shoulder labral surgery in 2018. Hx of intermittent low back pain for several years. She had an injection at about age 19-20 yrs for an episode of back pain.  Pain:     Current pain ratin    At best pain ratin    At worst pain ratin    Location:  Low back, bilateral buttocks/posterior thighs; roro upper traps.    Quality:  Aching, numbness and sharp     Pain timing:  When active    Relieving factors:  Rest (Partial reclined position.)    Aggravating factors:  Activity, walking, bending and sitting (Return from forward bend.)  Diagnostic Tests:     None    Treatments:     None    Patient Goals:     Patient goals for therapy:  Decreased pain, return to work and return to sport/leisure activities    Other patient goals:  Able to perform all work duties without pain; return to gym and do eliptical and stair machines without pain.      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    • Seasonal allergies    • Shoulder tendonitis      Past Surgical History:   Procedure Laterality Date   • SHOULDER DECOMPRESSION ARTHROSCOPIC Right 1/17/2018    Procedure: SHOULDER DECOMPRESSION ARTHROSCOPIC - SUBACROMIAL;  Surgeon: Khadar Pate M.D.;  Location: [facility];  Service: Orthopedics   • SHOULDER ARTHROSCOPY W/ ROTATOR CUFF REPAIR  1/17/2018    Procedure: SHOULDER ARTHROSCOPY W/ ROTATOR CUFF REPAIR;  Surgeon: Khadar Pate M.D.;  Location: [facility];  Service: Orthopedics   • SHOULDER ARTHROSCOPY W/ BICIPITAL TENODESIS REPAIR  1/17/2018    Procedure: SHOULDER ARTHROSCOPY W/ BICIPITAL TENODESIS REPAIR;  Surgeon: Khadar Pate M.D.;  Location: [facility];  Service: Orthopedics   • GANGLION EXCISION Left 2005    From wrist   • LUMPECTOMY Left 2004    benign fibroadenoma to breast   • ADENOIDECTOMY  1990   • DENTAL EXTRACTION(S)  1990's    [place] teeth     Social History   Substance Use Topics   • Smoking status: Never Smoker   • Smokeless tobacco: Never Used   • Alcohol use 6.0 oz/week     10 Standard drinks or equivalent per week      Comment: 2-10 per week     Family and Occupational History     Social History   • Marital status:      Spouse name: N/A   • Number of children: N/A   • Years of education:  N/A       Objective     Postural Observations  Seated posture: fair  Standing posture: fair    Additional Postural Observation Details  R humeral head anterior.  Slightly excessive LS lordosis; no significant anterior pelvic tilt.    Hip Screen   Hip range of motion within functional limits with the following exceptions: Supine 90/90 Hip PROM ER/IR:  L  70 / 30  * R  60 / 20    HS 90/90: WNL bilaterally, R stiffer endfeel than L.    *R hip tighter than L; L LS more painful than R.    Active Range of Motion     Lumbar   Flexion: within functional limits (Finger to TC joint.)  Extension: within functional limits (75%, slight relief, but pinching along central LS.)  Left lateral flexion: decreased (-ROS)  Right lateral flexion: decreased (* +ROS.)  Left rotation: decreased (+ROS.)  Right rotation: decreased (+ROS.)    General Comments     Spine Comments   Ankle DF limited bilaterally.        Therapeutic Exercises (CPT 92478):     1. Repeated extn, standing, prone, 2x10 ea, HEP  // No significant different between positions.    2. BKFO, 2x5 ea, HEP    3. Heel slides, 2x5 ea, HEP    4. Posture sitting and standing, HEP    Therapeutic Treatments and Modalities:     1. Manual Therapy (CPT 22896), 1. STM roro psoas/iliacus, // Slight decr in symptoms standing/walking.    Time-based treatments/modalities:  Manual therapy minutes (CPT 45006): 10 minutes  Therapeutic exercise minutes (CPT 75275): 20 minutes       Assessment, Response and Plan:   Impairments: abnormal ADL function, difficulty performing job, impaired functional mobility, lacks appropriate home exercise program and pain with function    Assessment details:  The patient is a 33 y.o. female with c/c of pain in her lumbar spine, lower CS, and bilateral upper trapezius areas, as well as R shoulder. Signs/symptoms consistent with lumbar disc involvement and residual R shoulder pain from labral repair surgery in 1/2018. The patient will benefit from skilled PT to  address the above impairments, improve hip mobility and strength, improve trunk stability, and to provide education on a self-managed HEP in order to facilitate a safe return to PLOF including all work duties with minimal to no pain.    Barriers to therapy:  None  Prognosis: good    Goals:   Short Term Goals:   1. Independent with HEP.  2. Able to walk for at least 20 min on even surface at self selected pace without increased low back pain.  3. Able to sit for at least 30 minutes without limitation by increased lower CS pain.    Short term goal time span:  2-4 weeks      Long Term Goals:    1. Independent with HEP and progressions/regressions of exercises and activities as necessary.  2. Able to stand/walk for at least 60 min on even surface at self selected pace without limitation by pain.  3. Able to bend down to floor level to retrieve items at floor level without limitation by pain.  4. Able to perform all work duties during a normal shift, including transferring patients, without limitation by pain.    Long term goal time span:  6-8 weeks    Plan:   Therapy options:  Physical therapy treatment to continue  Planned therapy interventions:  E Stim Unattended (CPT 47528), Functional Training, Self Care (CPT 16486), Gait Training (CPT 08898), Manual Therapy (CPT 91306), Neuromuscular Re-education (CPT 13504), Self Care ADL Training (CPT 68853), Therapeutic Activities (CPT 20909) and Therapeutic Exercise (CPT 68339)  Frequency:  2x week (1-2x/wk as needed)  Duration in weeks:  6  Duration in visits:  6  Discussed with:  Patient      Functional Limitation G-Codes and Severity Modifiers  Neck Disability Total: 16  Francis Rakesh Low Back Pain and Disability Score: 41.67  Quickdash General Total Score: 47.73     Referring provider co-signature:  I have reviewed this plan of care and my co-signature certifies the need for services.  Certification Dates:   From  12/10/2018    To 01/21/2018    Physician Signature:  ________________________________ Date: ______________

## 2018-12-17 ENCOUNTER — PHYSICAL THERAPY (OUTPATIENT)
Dept: PHYSICAL THERAPY | Facility: REHABILITATION | Age: 33
End: 2018-12-17
Attending: PREVENTIVE MEDICINE
Payer: COMMERCIAL

## 2018-12-17 DIAGNOSIS — S39.012D LUMBAR SPINE STRAIN, SUBSEQUENT ENCOUNTER: ICD-10-CM

## 2018-12-17 PROCEDURE — 97110 THERAPEUTIC EXERCISES: CPT

## 2018-12-17 PROCEDURE — 97140 MANUAL THERAPY 1/> REGIONS: CPT

## 2018-12-18 NOTE — OP THERAPY DAILY TREATMENT
Outpatient Physical Therapy  DAILY TREATMENT     [facility] Outpatient Physical Therapy [place] [address] [employer] [facility], [address]  [place] [address]  Phone:  [phone]    Date: 12/17/2018    Patient: Kendra Perez  YOB: 1985  MRN: [id number]     Time Calculation  Start time: 1600  Stop time: 1640 Time Calculation (min): 40 minutes     Chief Complaint: Back Problem    Visit #: 2    SUBJECTIVE:  Was a little sore after last session. Assembled a piece of furniture sitting on floor yesterday and     OBJECTIVE:  Current objective measures:  P1: R LS.  P2: R buttock.  Ast: Transfers supine <-> sit. Hip hinge - L LS pain today.    Supine 90/90 Hip PROM ER/IR:  L  70 / 30  * R  60 / 20    HS 90/90: WNL bilaterally, R stiffer endfeel than L.            Therapeutic Exercises (CPT 58781):     1. Standing repeated extn    2. Supine hip flexor stretch, 3x30 sec ea, HEP    3. Standing quad stretch, 1x30 sec ea, HEP (for work)    4. Qdpd rock backs, 2x10, HEP    5. 1/2 FR calf stretch, 2x30 sec ea, HEP    6. Hip hinge, Not tolerated due to L LS pain., // Hold until next session.    7. Review of neutral spine.    Therapeutic Treatments and Modalities:     1. Manual Therapy (CPT 64145), 1. Prone STM TS, LS parasp. Gentle TS mobs., // Decr back pain/tension. No change in LE symptoms., 2. Prone STM R QL, glute med., // Further decr in symptoms., 3. Hk lying, bolster, STM roro psoas., // Less pain walking, but still pain deep in R buttock.    Time-based treatments/modalities:  Manual therapy minutes (CPT 80166): 15 minutes  Therapeutic exercise minutes (CPT 19144): 25 minutes       Pain rating before treatment: 6  Pain rating after treatment: 4    ASSESSMENT:   Response to treatment: Responded well to interventions with decreased LS pain, but no change in R buttock symptoms. No discomfort with exercises except for hip hinge.     Will benefit from continued skilled PT to address remaining hip mobility/strength deficits, neural mobility  deficits, and trunk strength deficits.    PLAN/RECOMMENDATIONS:   Plan for treatment: therapy treatment to continue next visit.  Planned interventions for next visit: continue with current treatment.

## 2018-12-19 ENCOUNTER — OCCUPATIONAL MEDICINE (OUTPATIENT)
Dept: OCCUPATIONAL MEDICINE | Facility: CLINIC | Age: 33
End: 2018-12-19
Payer: COMMERCIAL

## 2018-12-19 VITALS
WEIGHT: 161 LBS | DIASTOLIC BLOOD PRESSURE: 70 MMHG | BODY MASS INDEX: 25.88 KG/M2 | HEIGHT: 66 IN | TEMPERATURE: 98.7 F | OXYGEN SATURATION: 99 % | HEART RATE: 85 BPM | SYSTOLIC BLOOD PRESSURE: 122 MMHG

## 2018-12-19 DIAGNOSIS — M77.8 RIGHT SHOULDER TENDONITIS: ICD-10-CM

## 2018-12-19 DIAGNOSIS — S16.1XXD STRAIN OF NECK MUSCLE, SUBSEQUENT ENCOUNTER: ICD-10-CM

## 2018-12-19 DIAGNOSIS — S39.012D STRAIN OF LUMBAR REGION, SUBSEQUENT ENCOUNTER: ICD-10-CM

## 2018-12-19 PROCEDURE — 99213 OFFICE O/P EST LOW 20 MIN: CPT | Performed by: PREVENTIVE MEDICINE

## 2018-12-19 RX ORDER — CYCLOBENZAPRINE HCL 10 MG
10 TABLET ORAL
Qty: 20 TAB | Refills: 0 | Status: SHIPPED | OUTPATIENT
Start: 2018-12-19 | End: 2020-01-10

## 2018-12-19 ASSESSMENT — ENCOUNTER SYMPTOMS
SENSORY CHANGE: 0
TINGLING: 1

## 2018-12-19 ASSESSMENT — PAIN SCALES - GENERAL: PAINLEVEL: 6=MODERATE PAIN

## 2018-12-19 NOTE — LETTER
[address],   [address] [place] [address]  Phone:  [phone] - Fax:  [phone]   Occupational Health [facility] Progress Report and Disability Certification  Date of Service: 12/19/2018   No Show:  No  Date / Time of Next Visit: 1/8/2019 @ 8:45 AM   Claim Information   Patient Name: Kendra Perez  Claim Number:     Employer: [employer]  Date of Injury:      Insurer / TPA: [employer]  ID / SSN:     Occupation:  RN Diagnosis: Diagnoses of Strain of lumbar region, subsequent encounter, Strain of neck muscle, subsequent encounter, and Right shoulder tendonitis were pertinent to this visit.    Medical Information   Related to Industrial Injury?   Comments:back/neck - yes; right shoulder-indetermiante     Subjective Complaints:  DOI 11/20/2018: 34 yo female presents with low back pain. 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 She also noted increase in right shoulder pain.  Patient states that overall her lower back and neck pain is about the same.  She states that she discontinued acute right sided radiating pain down the right leg.  Symptoms have persisted despite a few visits of physical therapy, OTC and prescription medications.  She notes that the pain seems worse with walking now and has difficulty walking for more than 30 minutes.   Objective Findings: Cervical/thoracic: No gross deformity.  Full range of motion with pain.  Lumbar: No gross deformity. Full ROM with pain.   Slight antalgic gait.   Pre-Existing Condition(s):     Assessment:   Condition Same    Status: Additional Care Required  Permanent Disability:No    Plan:      Diagnostics:      Comments:  Referral for MRI Lumbar and Cervical spine w/o  Continue physical therapy  Prescribe Flexeril, continue diclofenac  Continue restricted duty  Follow-up 3 weeks    Disability Information   Status: Released to Restricted Duty    From:   12/19/2018  Through: 1/8/2019 Restrictions are: Temporary   Physical Restrictions   Sitting:    Standing:    Stooping:  < or = to 1 hr/day Bending:  < or = to 1 hr/day   Squatting:    Walking:  < or = to 4 hrs/day Climbing:    Pushing:      Pulling:    Other:    Reaching Above Shoulder (L):   Reaching Above Shoulder (R):       Reaching Below Shoulder (L):    Reaching Below Shoulder (R):      Not to exceed Weight Limits   Carrying(hrs):   Weight Limit(lb): < or = to 10 pounds Lifting(hrs):   Weight  Limit(lb): < or = to 10 pounds   Comments:      Repetitive Actions   Hands: i.e. Fine Manipulations from Grasping:     Feet: i.e. Operating Foot Controls:     Driving / Operate Machinery:     Physician Name: Frankie Felix D.O. Physician Signature: FRANKIE Schwab D.O. e-Signature: Dr. Irving Hughes, Medical Director   Clinic Name / Location: [address],   [address] [address] Clinic Phone Number: Dept: [phone]   Appointment Time: 8:15 Am Visit Start Time: 8:15 AM   Check-In Time:  8:13 Am Visit Discharge Time:  9:03 AM   Original-Treating Physician or Chiropractor    Page 2-Insurer/TPA    Page 3-Employer    Page 4-Employee

## 2018-12-19 NOTE — PROGRESS NOTES
"Subjective:      Kendra Perez is a 33 y.o. female who presents with Follow-Up ( DOI: 11-20-18 BACK -same-)      DOI 11/20/2018: 32 yo female presents with low back pain. 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 She also noted increase in right shoulder pain.  Patient states that overall her lower back and neck pain is about the same.  She states that she discontinued acute right sided radiating pain down the right leg.  Symptoms have persisted despite a few visits of physical therapy, OTC and prescription medications.  She notes that the pain seems worse with walking now and has difficulty walking for more than 30 minutes.     HPI    Review of Systems   Neurological: Positive for tingling. Negative for sensory change.     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     Objective:     /70 (BP Location: Right arm, Patient Position: Sitting)   Pulse 85   Temp 37.1 °C (98.7 °F) (Temporal)   Ht 1.676 m (5' 6\")   Wt 73 kg (161 lb)   SpO2 99%   BMI 25.99 kg/m²      Physical Exam   Constitutional: She is oriented to person, place, and time. She appears well-developed and well-nourished.   Cardiovascular: Normal rate.    Pulmonary/Chest: Effort normal.   Neurological: She is alert and oriented to person, place, and time.   Skin: Skin is warm and dry.   Psychiatric: She has a normal mood and affect. Judgment normal.       Cervical/thoracic: No gross deformity.  Full range of motion with pain.  Lumbar: No gross deformity. Full ROM with pain.   Normal gait.  Right shoulder:  Full range of motion.        Assessment/Plan:     1. Strain of lumbar region, subsequent encounter  - cyclobenzaprine (FLEXERIL) 10 MG Tab; Take 1 Tab by mouth at bedtime as needed.  Dispense: 20 Tab; Refill: 0  - REFERRAL TO RADIOLOGY  - MR-LUMBAR SPINE-W/O; Future    2. Strain of neck muscle, subsequent encounter  - cyclobenzaprine (FLEXERIL) " 10 MG Tab; Take 1 Tab by mouth at bedtime as needed.  Dispense: 20 Tab; Refill: 0  - REFERRAL TO RADIOLOGY  - MR-CERVICAL SPINE-W/O; Future    3. Right shoulder tendonitis  - cyclobenzaprine (FLEXERIL) 10 MG Tab; Take 1 Tab by mouth at bedtime as needed.  Dispense: 20 Tab; Refill: 0     Referral for MRI Lumbar and Cervical spine w/o   Continue physical therapy   Prescribe Flexeril, continue diclofenac   Continue restricted duty   Follow-up 3 weeks

## 2018-12-20 ENCOUNTER — PHYSICAL THERAPY (OUTPATIENT)
Dept: PHYSICAL THERAPY | Facility: REHABILITATION | Age: 33
End: 2018-12-20
Attending: PREVENTIVE MEDICINE
Payer: COMMERCIAL

## 2018-12-20 DIAGNOSIS — S39.012D LUMBAR SPINE STRAIN, SUBSEQUENT ENCOUNTER: ICD-10-CM

## 2018-12-20 PROCEDURE — 97140 MANUAL THERAPY 1/> REGIONS: CPT

## 2018-12-20 PROCEDURE — 97110 THERAPEUTIC EXERCISES: CPT

## 2018-12-20 NOTE — OP THERAPY DAILY TREATMENT
"  Outpatient Physical Therapy  DAILY TREATMENT     [facility] Physical Therapy [place] [address] [facility], [address]  [place] [address]  Phone:  [phone]    Date: 12/20/2018    Patient: Kendra Perez  YOB: 1985  MRN: [id number]     Time Calculation  Start time: 0730  Stop time: 0800 Time Calculation (min): 30 minutes     Chief Complaint: Back Problem    Visit #: 3    SUBJECTIVE:  R low back and leg are much better - less pain with walking. Sitting still bothers R side. L low back is better. Overall, hard to say if getting better due to setback after putting together couch. But does feel better waking up today than yesterday.    OBJECTIVE:  Current objective measures:  P1: R LS.  P2: R buttock.  Ast: Transfers supine <-> sit. Hip hinge - L LS pain today.    Supine 90/90 Hip PROM ER/IR:  L  70 / 30  * R  60 / 20    HS 90/90: WNL bilaterally, R stiffer endfeel than L.            Therapeutic Exercises (CPT 32583):     1. Standing repeated extn, 1x12, HEP  // \"Helps loosen things up.\"    2. * Prone repeated ext, 2x10, HEP    3. Standing quad stretch, 1x30 sec ea, HEP (for work)    4. Qdpd rock backs, 2x10, HEP    5. 1/2 FR calf stretch, 2x30 sec ea, HEP    6. Hip hinge, Not tolerated due to L LS pain., // Reassess next session.    7. Review of neutral spine.    9. Supine hip flexor stretch, 3x30 sec ea, HEP    Therapeutic Treatments and Modalities:     1. Manual Therapy (CPT 14015), 1. Prone STM TS, LS parasp. Gentle TS mobs., // Decr back pain/tension. No change in LE symptoms., 2. Prone STM R QL., // Not tolerated due to +ROS R buttock., 3. S/Ling STM R glute med, deep hip ERs., // Decr tissue tension.    Therapeutic Treatment and Modalities Summary: Previous:  Hk lying, bolster, STM roro psoas.    Time-based treatments/modalities:  Manual therapy minutes (CPT 70120): 10 minutes  Therapeutic exercise minutes (CPT 99586): 20 minutes       Pain rating before treatment: 5  Pain rating after treatment: " 3    ASSESSMENT:   Response to treatment:   Responded well to interventions with decreased LS tightness and slight decrease in walking pain, but no change in L LS pain with hip hinge.    Will benefit from continued skilled PT to address remaining hip mobility/strength deficits, neural mobility deficits, and trunk strength deficits.    PLAN/RECOMMENDATIONS:   Plan for treatment: therapy treatment to continue next visit.  Planned interventions for next visit: continue with current treatment.

## 2018-12-26 ENCOUNTER — APPOINTMENT (OUTPATIENT)
Dept: PHYSICAL THERAPY | Facility: REHABILITATION | Age: 33
End: 2018-12-26
Attending: PREVENTIVE MEDICINE
Payer: COMMERCIAL

## 2018-12-28 ENCOUNTER — APPOINTMENT (OUTPATIENT)
Dept: PHYSICAL THERAPY | Facility: REHABILITATION | Age: 33
End: 2018-12-28
Attending: PREVENTIVE MEDICINE
Payer: COMMERCIAL

## 2019-01-02 ENCOUNTER — APPOINTMENT (OUTPATIENT)
Dept: RADIOLOGY | Facility: MEDICAL CENTER | Age: 34
End: 2019-01-02
Attending: PREVENTIVE MEDICINE
Payer: COMMERCIAL

## 2019-01-02 ENCOUNTER — PHYSICAL THERAPY (OUTPATIENT)
Dept: PHYSICAL THERAPY | Facility: REHABILITATION | Age: 34
End: 2019-01-02
Attending: PREVENTIVE MEDICINE
Payer: COMMERCIAL

## 2019-01-02 DIAGNOSIS — S39.012D LUMBAR SPINE STRAIN, SUBSEQUENT ENCOUNTER: ICD-10-CM

## 2019-01-02 PROCEDURE — 97140 MANUAL THERAPY 1/> REGIONS: CPT

## 2019-01-02 PROCEDURE — 97110 THERAPEUTIC EXERCISES: CPT

## 2019-01-02 NOTE — OP THERAPY DAILY TREATMENT
"  Outpatient Physical Therapy  DAILY TREATMENT     [facility] Physical Therapy [place] [address] [facility], [address]  [place] [address]  Phone:  [phone]    Date: 01/02/2019    Patient: Kendra Perez  YOB: 1985  MRN: [id number]     Time Calculation  Start time: 1100  Stop time: 1130 Time Calculation (min): 30 minutes     Chief Complaint: Back Problem    Visit #: 4    SUBJECTIVE:  Was sick last week and took a few days off work. She had one episode of L hip and low back pain.    OBJECTIVE:  Current objective measures:  P1: L lateral LS.  No pain on R today.  Ast: Return from R rotn +P1.    Supine 90/90 Hip PROM ER/IR:  L  70 / 30  * R  60 / 20    HS 90/90: WNL bilaterally, R stiffer endfeel than L.            Therapeutic Exercises (CPT 97333):     1. Standing repeated extn, 1x12, HEP  // \"Helps loosen things up.\"    2. Cat camel, 1x10, HEP, 1x30 sec ea, HEP (for work)    4. Qdpd rock backs, 2x10, HEP    5. 1/2 FR calf stretch, 2x30 sec ea, HEP    6. Hip hinge, Not tolerated due to L LS pain., // Reassess next session.    7. Review of neutral spine.    9. Supine hip flexor stretch, 3x30 sec ea, HEP    10. Standing quad stretch      Therapeutic Exercise Summary: Previous:  * Prone repeated ext    Therapeutic Treatments and Modalities:     1. Manual Therapy (CPT 94905), 1. Prone STM TS, LS parasp. Gentle TS mobs., 2. S/Ling STM R glute med, deep hip ERs., // Decr tissue tension.    Therapeutic Treatment and Modalities Summary: Previous:  Hk lying, bolster, STM roro psoas.  2. Prone STM R QL. // Not tolerated due to +ROS R buttock.    Time-based treatments/modalities:  Manual therapy minutes (CPT 33963): 10 minutes  Therapeutic exercise minutes (CPT 09297): 20 minutes       Pain rating before treatment: 4  Pain rating after treatment: 2    ASSESSMENT:   Response to treatment:   Responded well to interventions today with decreased L LS pain/tightness. Discussed importance of not locking out knees " standing/walking.    Will benefit from continued skilled PT to address remaining hip mobility/strength deficits, neural mobility deficits, and trunk strength deficits.    PLAN/RECOMMENDATIONS:   Plan for treatment: therapy treatment to continue next visit.  Planned interventions for next visit: continue with current treatment.

## 2019-01-04 ENCOUNTER — HOSPITAL ENCOUNTER (OUTPATIENT)
Dept: RADIOLOGY | Facility: MEDICAL CENTER | Age: 34
End: 2019-01-04
Attending: PREVENTIVE MEDICINE
Payer: COMMERCIAL

## 2019-01-04 DIAGNOSIS — S16.1XXD STRAIN OF NECK MUSCLE, SUBSEQUENT ENCOUNTER: ICD-10-CM

## 2019-01-04 DIAGNOSIS — S39.012D STRAIN OF LUMBAR REGION, SUBSEQUENT ENCOUNTER: ICD-10-CM

## 2019-01-04 PROCEDURE — 72148 MRI LUMBAR SPINE W/O DYE: CPT

## 2019-01-04 PROCEDURE — 72141 MRI NECK SPINE W/O DYE: CPT

## 2019-01-07 ENCOUNTER — HOSPITAL ENCOUNTER (OUTPATIENT)
Dept: LAB | Facility: MEDICAL CENTER | Age: 34
End: 2019-01-07
Attending: OBSTETRICS & GYNECOLOGY
Payer: COMMERCIAL

## 2019-01-07 ENCOUNTER — PHYSICAL THERAPY (OUTPATIENT)
Dept: PHYSICAL THERAPY | Facility: REHABILITATION | Age: 34
End: 2019-01-07
Attending: PREVENTIVE MEDICINE
Payer: COMMERCIAL

## 2019-01-07 DIAGNOSIS — S39.012D LUMBAR SPINE STRAIN, SUBSEQUENT ENCOUNTER: ICD-10-CM

## 2019-01-07 PROCEDURE — 87491 CHLMYD TRACH DNA AMP PROBE: CPT

## 2019-01-07 PROCEDURE — 97140 MANUAL THERAPY 1/> REGIONS: CPT

## 2019-01-07 PROCEDURE — 87591 N.GONORRHOEAE DNA AMP PROB: CPT

## 2019-01-07 PROCEDURE — 97110 THERAPEUTIC EXERCISES: CPT

## 2019-01-07 NOTE — OP THERAPY DAILY TREATMENT
"  Outpatient Physical Therapy  DAILY TREATMENT     [facility] Physical Therapy [place] [id number] [employer] [facility], [address]  [place] [address]  Phone:  [phone]    Date: 01/07/2019    Patient: Kendra Perez  YOB: 1985  MRN: [id number]     Time Calculation  Start time: 1130  Stop time: 1200 Time Calculation (min): 30 minutes     Chief Complaint: Back Problem    Visit #: 5    SUBJECTIVE:  Felt good after last session. Overall, feels symptoms are about the same. Did a lot of walking Saturday night and had same left back pain and pain down left leg; she also slipped while walking Sat night and tweaked back. More aware of not locking out knees while walking.    OBJECTIVE:  Current objective measures:  P1: L lateral LS.  No pain on R today.  Ast: Return from R rotn +P1.    R unilat PAs: hypomobile, +ROS into R buttock.  L unilat PAs: Hypomobile, ++tissue tension/guarding.    Supine 90/90 Hip PROM ER/IR:  L  70 / 30  * R  60 / 20    HS 90/90: WNL bilaterally, R stiffer endfeel than L.            Therapeutic Exercises (CPT 43256):     1. Standing repeated extn, 1x12, HEP  // \"Helps loosen things up.\"    2. Open books, 2x10 ea, HEP    3. Hk lying knee drops, 2x10, HEP    4. Bridges, 1x10, HEP    5. Qdpd rock backs, 1x10, HEP    8. 1/2 FR calf stretch, 2x30 sec ea    9. Supine hip flexor stretch, 3x30 sec ea, HEP    10. Standing quad stretch      Therapeutic Exercise Summary: Previous:  * Prone repeated ext    Therapeutic Treatments and Modalities:     1. Manual Therapy (CPT 23770), 1. Prone STM TS, LS parasp. Gentle TS mobs., 2. S/Ling STM R glute med, deep hip ERs., // Decr tissue tension.    Therapeutic Treatment and Modalities Summary: Previous:  Hk lying, bolster, STM roro psoas.  2. Prone STM R QL. // Not tolerated due to +ROS R buttock.    Time-based treatments/modalities:  Manual therapy minutes (CPT 39140): 10 minutes  Therapeutic exercise minutes (CPT 67112): 20 minutes       Pain rating before treatment: " 4  Pain rating after treatment: 2-3    ASSESSMENT:   Response to treatment:   Responded well to interventions today with decreased L LS pain/tightness but some pain remained. No discomfort with non-weight bearing exercises.    Will benefit from continued skilled PT to address remaining hip mobility/strength deficits, neural mobility deficits, and trunk strength deficits.    PLAN/RECOMMENDATIONS:   Plan for treatment: therapy treatment to continue next visit.  Planned interventions for next visit: continue with current treatment.

## 2019-01-08 ENCOUNTER — OCCUPATIONAL MEDICINE (OUTPATIENT)
Dept: OCCUPATIONAL MEDICINE | Facility: CLINIC | Age: 34
End: 2019-01-08
Payer: COMMERCIAL

## 2019-01-08 VITALS
HEIGHT: 66 IN | HEART RATE: 88 BPM | RESPIRATION RATE: 16 BRPM | BODY MASS INDEX: 25.71 KG/M2 | DIASTOLIC BLOOD PRESSURE: 7 MMHG | TEMPERATURE: 98.9 F | OXYGEN SATURATION: 96 % | WEIGHT: 160 LBS | SYSTOLIC BLOOD PRESSURE: 116 MMHG

## 2019-01-08 DIAGNOSIS — S39.012D STRAIN OF LUMBAR REGION, SUBSEQUENT ENCOUNTER: ICD-10-CM

## 2019-01-08 DIAGNOSIS — S16.1XXD STRAIN OF NECK MUSCLE, SUBSEQUENT ENCOUNTER: ICD-10-CM

## 2019-01-08 LAB
C TRACH DNA SPEC QL NAA+PROBE: NEGATIVE
N GONORRHOEA DNA SPEC QL NAA+PROBE: NEGATIVE
SPECIMEN SOURCE: NORMAL

## 2019-01-08 PROCEDURE — 99213 OFFICE O/P EST LOW 20 MIN: CPT | Performed by: PREVENTIVE MEDICINE

## 2019-01-08 ASSESSMENT — ENCOUNTER SYMPTOMS
SENSORY CHANGE: 0
TINGLING: 0

## 2019-01-08 NOTE — PROGRESS NOTES
"Subjective:      Kendra Perez is a 33 y.o. female who presents with Other ([facility] DOI: 11-20-18 BACK - same, rm 17)      DOI 11/20/2018: 34 yo female presents with low back pain. 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  Patient has noticed some slow, gradual improvement with physical therapy.  But continues to have pain in the neck and in the lower back.  She has completed 5 sessions of physical therapy.  She had MRI performed last week.     HPI    Review of Systems   Neurological: Negative for tingling and sensory change.     SOCHX: Works as a an RN at [employer]   : No pertinent family history to this problem.     Objective:     BP (!) 116/7   Pulse 88   Temp 37.2 °C (98.9 °F)   Resp 16   Ht 1.676 m (5' 6\")   Wt 72.6 kg (160 lb)   SpO2 96%   BMI 25.82 kg/m²      Physical Exam   Constitutional: She is oriented to person, place, and time. She appears well-developed and well-nourished.   Cardiovascular: Normal rate.    Pulmonary/Chest: Effort normal.   Neurological: She is alert and oriented to person, place, and time.   Skin: Skin is warm and dry.   Psychiatric: She has a normal mood and affect. Judgment normal.       Cervical/thoracic: No gross deformity.  Full range of motion with pain.  Lumbar: No gross deformity. Full ROM with pain.   Normal gait.    MRI Cervical: Minimal posterior disc bulging at C4-5 and C5-6, without evidence for neural compression.  MRI Lumbar: Mild posterior disc bulging with associated annular fissures at L4-5 and L5-S1       Assessment/Plan:     1. Strain of lumbar region, subsequent encounter  - REFERRAL TO PHYSICAL THERAPY Reason for Therapy: Eval/Treat/Report    2. Strain of neck muscle, subsequent encounter  - REFERRAL TO PHYSICAL THERAPY Reason for Therapy: Eval/Treat/Report    Patient would like to continue another course of physical therapy  If at next appointment condition is not improving will refer to " physiatry  Restricted duty, less than restriction somewhat  Follow-up 3 weeks

## 2019-01-08 NOTE — LETTER
[facility]  [address],   [address] [place] [address]  Phone:  [phone] - Fax:  [phone]   [facility] Progress Report and Disability Certification  Date of Service: 1/8/2019   No Show:  No  Date / Time of Next Visit: 1/29/2019 @ 9:30 AM   Claim Information   Patient Name: Kendra Perez  Claim Number:     Employer: [employer]  Date of Injury: 11/20/2018     Insurer / TPA: [employer]  ID / SSN:     Occupation: Registered Nurse  Diagnosis: Diagnoses of Strain of lumbar region, subsequent encounter and Strain of neck muscle, subsequent encounter were pertinent to this visit.    Medical Information   Related to Industrial Injury? Yes    Subjective Complaints:  DOI 11/20/2018: 34 yo female presents with low back pain. 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  Patient has noticed some slow, gradual improvement with physical therapy.  But continues to have pain in the neck and in the lower back.  She has completed 5 sessions of physical therapy.  She had MRI performed last week.   Objective Findings: Cervical/thoracic: No gross deformity.  Full range of motion with pain.  Lumbar: No gross deformity. Full ROM with pain.   Normal gait.    MRI Cervical: Minimal posterior disc bulging at C4-5 and C5-6, without evidence for neural compression.  MRI Lumbar: Mild posterior disc bulging with associated annular fissures at L4-5 and L5-S1   Pre-Existing Condition(s):     Assessment:   Condition Same    Status: Additional Care Required  Permanent Disability:No    Plan:      Diagnostics:      Comments:  Patient would like to continue another course of physical therapy  If at next appointment condition is not improving will refer to physiatry  Restricted duty, less than restriction somewhat  Follow-up 3 weeks       Disability Information   Status: Released to Restricted Duty    From:  1/8/2019  Through:  1/29/2019 Restrictions are: Temporary   Physical Restrictions   Sitting:    Standing:    Stooping:  < or = to 1 hr/day Bending:  < or = to 1 hr/day   Squatting:    Walking:    Climbing:    Pushing:      Pulling:    Other:    Reaching Above Shoulder (L):   Reaching Above Shoulder (R):       Reaching Below Shoulder (L):    Reaching Below Shoulder (R):      Not to exceed Weight Limits   Carrying(hrs):   Weight Limit(lb): < or = to 10 pounds Lifting(hrs):   Weight  Limit(lb): < or = to 10 pounds   Comments:      Repetitive Actions   Hands: i.e. Fine Manipulations from Grasping:     Feet: i.e. Operating Foot Controls:     Driving / Operate Machinery:     Physician Name: Frankie Felix D.O. Physician Signature: FRANKIE Schwab D.O. e-Signature: Dr. Irving Hughes, Medical Director   Clinic Name / Location: [address],   [address] [place] [address] Clinic Phone Number: Dept: [phone]   Appointment Time: 8:45 Am Visit Start Time: 8:47 AM   Check-In Time:  8:42 Am Visit Discharge Time:  9:15 AM   Original-Treating Physician or Chiropractor    Page 2-Insurer/TPA    Page 3-Employer    Page 4-Employee

## 2019-01-09 ENCOUNTER — PHYSICAL THERAPY (OUTPATIENT)
Dept: PHYSICAL THERAPY | Facility: REHABILITATION | Age: 34
End: 2019-01-09
Attending: PREVENTIVE MEDICINE
Payer: COMMERCIAL

## 2019-01-09 DIAGNOSIS — S16.1XXD NECK MUSCLE STRAIN, SUBSEQUENT ENCOUNTER: ICD-10-CM

## 2019-01-09 DIAGNOSIS — S39.012D LUMBAR SPINE STRAIN, SUBSEQUENT ENCOUNTER: ICD-10-CM

## 2019-01-09 PROCEDURE — 97110 THERAPEUTIC EXERCISES: CPT

## 2019-01-09 PROCEDURE — 97140 MANUAL THERAPY 1/> REGIONS: CPT

## 2019-01-09 NOTE — OP THERAPY DAILY TREATMENT
"  Outpatient Physical Therapy  DAILY TREATMENT     [facility] Outpatient Physical Therapy [place] [address] [facility] [address] [facility] [address], [address]  [place] [address]  Phone:  [phone]    Date: 01/09/2019    Patient: Kendra Perez  YOB: 1985  MRN: [id number]     Time Calculation  Start time: 1500  Stop time: 1530 Time Calculation (min): 30 minutes     Chief Complaint: Back Problem    Visit #: 6    SUBJECTIVE:  Back has gotten a little better; hurts most of the time but less than before. Stretches are helpful. Lower neck is very sore today.    OBJECTIVE:  Current objective measures:  P1: L lateral LS.  No pain on L or R LS with standing rotn today.  Ast: Return from R rotn +P1.      Previous:  R unilat PAs: hypomobile, +ROS into R buttock.  L unilat PAs: Hypomobile, ++tissue tension/guarding.    Supine 90/90 Hip PROM ER/IR:  L  70 / 30  * R  60 / 20    HS 90/90: WNL bilaterally, R stiffer endfeel than L.            Therapeutic Exercises (CPT 52263):     1. Standing repeated extn, 1x12, HEP  // \"Helps loosen things up.\"    2. Open books, 21x10, HEP    3. Hk lying knee drops, 1x10, HEP    4. Bridges, 1x10, Not done 1/9/19    5. Qdpd rock backs, 1x10, Not done 1/9/19    6. 1/2 FR calf stretch, 2x30 sec ea, HEP    7. Supine hip flexor stretch, 3x30 sec ea, HEP, HEP    10. Chin tucks, 1x5 , HEP    11. Supine T's + knee drops + chin tucks, 3x ea, 3 rounds, HEP      Therapeutic Exercise Summary: Previous:  * Prone repeated ext  Standing quad stretch  FR pec stretch    Therapeutic Treatments and Modalities:     1. Manual Therapy (CPT 73409), 1. TS Gr V setup, roro., 2. Supine STM subocc, R>L, gentle OA mobs., // Improved CS rotn AROM., 3. Tactile cues for chin tucks.    Therapeutic Treatment and Modalities Summary: Previous:  1. Prone STM TS, LS parasp. Gentle TS mobs.  2. S/Ling STM R glute med, deep hip ERs.  Hk lying, bolster, STM roro psoas.  2. Prone STM R QL. // Not tolerated due to +ROS R buttock.    Time-based " treatments/modalities:  Manual therapy minutes (CPT 48880): 15 minutes  Therapeutic exercise minutes (CPT 19229): 15 minutes       Pain rating before treatment: 4  Pain rating after treatment: 2-3    ASSESSMENT:   Response to treatment:   Responded well to interventions today with less upper back and neck stiffness, and improved CS and trunk AROM in standing and sitting.    Will benefit from continued skilled PT to address remaining hip mobility/strength deficits, neural mobility deficits, and trunk strength deficits.    PLAN/RECOMMENDATIONS:   Plan for treatment: therapy treatment to continue next visit.  Planned interventions for next visit: continue with current treatment.

## 2019-01-14 ENCOUNTER — PHYSICAL THERAPY (OUTPATIENT)
Dept: PHYSICAL THERAPY | Facility: REHABILITATION | Age: 34
End: 2019-01-14
Attending: PREVENTIVE MEDICINE
Payer: COMMERCIAL

## 2019-01-14 DIAGNOSIS — S16.1XXD NECK MUSCLE STRAIN, SUBSEQUENT ENCOUNTER: ICD-10-CM

## 2019-01-14 DIAGNOSIS — S39.012D LUMBAR SPINE STRAIN, SUBSEQUENT ENCOUNTER: ICD-10-CM

## 2019-01-14 DIAGNOSIS — M77.8 TENDINITIS OF RIGHT SHOULDER: ICD-10-CM

## 2019-01-14 PROCEDURE — 97110 THERAPEUTIC EXERCISES: CPT

## 2019-01-14 NOTE — OP THERAPY DAILY TREATMENT
"  Outpatient Physical Therapy  DAILY TREATMENT     [facility] Outpatient Physical Therapy [place]  [email or web address] [facility], [address]  [place] [address]  Phone:  [phone]    Date: 01/14/2019    Patient: Kendra Perez  YOB: 1985  MRN: [id number]     Time Calculation  Start time: 0900  Stop time: 0930 Time Calculation (min): 30 minutes     Chief Complaint: Back Problem    Visit #: 7    SUBJECTIVE:  Neck and upper back have been better, low back. Still doing light duty at work; walking around during shift makes low back sore after a couple hours; sitting at computer makes upper back/neck sore.    OBJECTIVE:  Current objective measures:  P1: L lateral LS.  No pain on L or R LS with standing rotn today.  Ast: Return from R rotn +P1.    Supine hip rotn, neutral: L hypomobile vs R.  // Improved after manual therapy.    Previous:  R unilat PAs: hypomobile, +ROS into R buttock.  L unilat PAs: Hypomobile, ++tissue tension/guarding.    Supine 90/90 Hip PROM ER/IR:  L  70 / 30  * R  60 / 20    HS 90/90: WNL bilaterally, R stiffer endfeel than L.            Therapeutic Exercises (CPT 68630):     1. FR pec stretch, TS extn over FR (perp to spine), 1x12, HEP    2. Hk lying knee drops, 1x10, HEP    3. Open books, 1x10, HEP    4. Bridges, 1x10, HEP    5. Qdpd rock backs, 1x10, Not done 1/14/19    6. Standing repeated extn, 2x30 sec ea, HEP  // \"Helps loosen things up.\"    7. Step outs, Green, 1x5 ea, // No discomfort    8. Supine hip flexor stretch, 2x1 min ea    9. 1/2 FR calf stretch, 2x1 min ea    11. Chin tucks, 1x5 , Not done 1/14/19    12. Supine T's + knee drops + chin tucks, 3x ea, 3 rounds, Not done 1/14/19      Therapeutic Exercise Summary: Previous:  * Prone repeated ext  Standing quad stretch  FR pec stretch    Therapeutic Treatments and Modalities:     1. Manual Therapy (CPT 81042), 1. S/Ling LS neutral gaping to open L., // \"Feels like a good stretch along the hip.\"    Therapeutic Treatment and Modalities Summary: " Previous:  1. Prone STM TS, LS parasp. Gentle TS mobs.  2. S/Ling STM R glute med, deep hip ERs.  Hk lying, bolster, STM roro psoas.  2. Prone STM R QL. // Not tolerated due to +ROS R buttock.    Time-based treatments/modalities:  Manual therapy minutes (CPT 55059): 5 minutes  Therapeutic exercise minutes (CPT 31424): 25 minutes       Pain rating before treatment: 3  Pain rating after treatment: 2    ASSESSMENT:   Response to treatment:   Responded well to interventions today with less upper back and neck stiffness, and improved trunk AROM in standing. Overall, progressing gradually although prolonged sitting and walking are still mildly aggravating to symptoms. Hip mobility deficits resolving, L still tighter than R.    Will benefit from continued skilled PT to address remaining hip mobility/strength deficits, neural mobility deficits, and trunk strength deficits.    PLAN/RECOMMENDATIONS:   Plan for treatment: therapy treatment to continue next visit.  Planned interventions for next visit: continue with current treatment.

## 2019-01-16 ENCOUNTER — PHYSICAL THERAPY (OUTPATIENT)
Dept: PHYSICAL THERAPY | Facility: REHABILITATION | Age: 34
End: 2019-01-16
Attending: PREVENTIVE MEDICINE
Payer: COMMERCIAL

## 2019-01-16 DIAGNOSIS — S39.012D LUMBAR SPINE STRAIN, SUBSEQUENT ENCOUNTER: ICD-10-CM

## 2019-01-16 PROCEDURE — 97140 MANUAL THERAPY 1/> REGIONS: CPT

## 2019-01-16 PROCEDURE — 97110 THERAPEUTIC EXERCISES: CPT

## 2019-01-16 NOTE — OP THERAPY DAILY TREATMENT
"  Outpatient Physical Therapy  DAILY TREATMENT     [facility] Outpatient Physical Therapy [place] [address] [facility], [address]  [place] [address]  Phone:  [phone]    Date: 01/16/2019    Patient: Kendra Perez  YOB: 1985  MRN: [id number]     Time Calculation  Start time: 0900  Stop time: 0930 Time Calculation (min): 30 minutes     Chief Complaint: Back Problem    Visit #: 8    SUBJECTIVE:  Back has been feeling better last couple weeks. Feels she is making progress. L clavicular area a little sore today.    OBJECTIVE:  Current objective measures:  P1: L lateral LS.  No pain on L or R LS with standing rotn today.  Ast: Return from R rotn +P1.    * ++ Chad Test L, 1 and 2 jt restrictions.  R WNL.  ++tiss tension, L iliacus, anterior hip. // Improved with man therapy.    Standing rotn: Limited L and +ROS.  // Improved ROM And less pain after interventions.    Previous:  R unilat PAs: hypomobile, +ROS into R buttock.  L unilat PAs: Hypomobile, ++tissue tension/guarding.            Therapeutic Exercises (CPT 83698):     1. FR pec stretch, TS extn over FR (perp to spine), 1x12, HEP    2. * Self STM L iliacus, HEP    3. Supine hip flexor stretch, 1x10, HEP    4. Stride stance, hip flexor stretch, 1x10, HEP    5. Bridges, 1x10, HEP    6. * Standing chanelle ab, sagittal, Green, 1x10 ea, HEP    7. * Pallof press, Green, 1x10 ea    8. Standing repeated extn, Review, HEP    9. 1/2 FR calf stretch, 2x1 min ea, HEP    11. Chin tucks, 1x5 , Not done 1/16/19    12. Supine T's + knee drops + chin tucks, 3x ea, 3 rounds, Not done 1/16/19      Therapeutic Exercise Summary: Previous:  Qdpd rock backs  Hk lying knee drops  Open books    * Prone repeated ext  Standing quad stretch  FR pec stretch    Therapeutic Treatments and Modalities:     1. Manual Therapy (CPT 84802), 1. S/Ling LS neutral gaping to open L., // \"Feels like a good stretch along the hip.\", 2. Hk lying STM L iliacus, anterior hip., // Decr pain with LS extn.    " Therapeutic Treatment and Modalities Summary: Previous:  1. Prone STM TS, LS parasp. Gentle TS mobs.  2. S/Ling STM R glute med, deep hip ERs.  Hk lying, bolster, STM roro psoas.  2. Prone STM R QL. // Not tolerated due to +ROS R buttock.    Time-based treatments/modalities:  Manual therapy minutes (CPT 66429): 10 minutes  Therapeutic exercise minutes (CPT 00770): 20 minutes       Pain rating before treatment: 3  Pain rating after treatment: 2    ASSESSMENT:   Response to treatment:   Overall, progressing gradually with less back pain. Hip mobility deficits resolving, L still tighter than R. Improved AROM and decreased pain after interventions today.    Will benefit from continued skilled PT to address remaining hip mobility/strength deficits, neural mobility deficits, and trunk strength deficits.    PLAN/RECOMMENDATIONS:   Plan for treatment: therapy treatment to continue next visit.  Planned interventions for next visit: continue with current treatment.

## 2019-01-21 ENCOUNTER — PHYSICAL THERAPY (OUTPATIENT)
Dept: PHYSICAL THERAPY | Facility: REHABILITATION | Age: 34
End: 2019-01-21
Attending: PREVENTIVE MEDICINE
Payer: COMMERCIAL

## 2019-01-21 DIAGNOSIS — S39.012D LUMBAR SPINE STRAIN, SUBSEQUENT ENCOUNTER: ICD-10-CM

## 2019-01-21 DIAGNOSIS — S16.1XXD NECK MUSCLE STRAIN, SUBSEQUENT ENCOUNTER: ICD-10-CM

## 2019-01-21 DIAGNOSIS — M77.8 TENDINITIS OF RIGHT SHOULDER: ICD-10-CM

## 2019-01-21 PROCEDURE — 97110 THERAPEUTIC EXERCISES: CPT

## 2019-01-21 PROCEDURE — 97140 MANUAL THERAPY 1/> REGIONS: CPT

## 2019-01-22 NOTE — OP THERAPY DAILY TREATMENT
Outpatient Physical Therapy  DAILY TREATMENT     [facility] Out[facility] Physical Therapy [place] [address] [address], [address]  [place] [address]  Phone:  [phone]    Date: 01/21/2019    Patient: Kendra Perez  YOB: 1985  MRN: [id number]     Time Calculation  Start time: 1700  Stop time: 1740 Time Calculation (min): 40 minutes     Chief Complaint: Back Problem    Visit #: 9    SUBJECTIVE:  Mid and lower back was feeling better after last time. Hip muscles were sore, but feeling better today.    OBJECTIVE:  Current objective measures:  P1: L lateral LS.  No pain on L or R LS with standing rotn today.  Ast: Return from R rotn +P1.    * ++ Chad Test L, 1 and 2 jt restrictions.  R WNL.  +tiss tension, L iliacus, anterior hip. // Improved with man therapy.    Standing rotn: Limited L and +ROS.  // Improved ROM after man therapy.    Previous:  R unilat PAs: hypomobile, +ROS into R buttock.  L unilat PAs: Hypomobile, ++tissue tension/guarding.             Therapeutic Exercises (CPT 86214):     1. FR pec stretch, TS extn over FR (perp to spine), 1x12, HEP    2. TB TS roll wall    3. Supine hip flexor stretch, 1x10, HEP    4. Stride stance, hip flexor stretch, Review, HEP    6. * Standing chanelle ab, sagittal, Green, 2x10 ea, HEP    7. * Pallof press, Green, 2x10 ea    9. 1/2 FR calf stretch, 2x1 min ea, HEP    11. Chin tucks, 1x5 , Not done 1/21/19    12. Supine T's + knee drops + chin tucks, 3x ea, 3 rounds, Not done 1/21/19      Therapeutic Exercise Summary: Previous:  Standing repeated extn  Bridges  Qdpd rock backs  Hk lying knee drops  Open books    * Prone repeated ext  Standing quad stretch  FR pec stretch    Therapeutic Treatments and Modalities:     1. Manual Therapy (CPT 05574), 1. Hk lying STM L iliacus, anterior hip., 2. STM suboccipitals, R>L., 3. TS Gr V, 2 levels ea side., // Improved seated TS extn.    Therapeutic Treatment and Modalities Summary: Previous:  1. S/Ling LS neutral gaping to open L.  1.  Prone STM TS, LS parasp. Gentle TS mobs.  2. S/Ling STM R glute med, deep hip ERs.  Hk lying, bolster, STM roro psoas.  2. Prone STM R QL. // Not tolerated due to +ROS R buttock.    Time-based treatments/modalities:  Manual therapy minutes (CPT 15361): 15 minutes  Therapeutic exercise minutes (CPT 31214): 25 minutes       Pain rating before treatment: 3  Pain rating after treatment: 2    ASSESSMENT:   Response to treatment:   Overall, progressing gradually with less back pain and faster recovery after flare ups. Hip mobility deficits resolving, anterior L hip still tighter than R. Improved AROM and decreased pain after interventions today.    Will benefit from continued skilled PT to address remaining hip mobility/strength deficits, neural mobility deficits, and trunk strength deficits.    PLAN/RECOMMENDATIONS:   Plan for treatment: therapy treatment to continue next visit.  Planned interventions for next visit: continue with current treatment.

## 2019-01-24 ENCOUNTER — PHYSICAL THERAPY (OUTPATIENT)
Dept: PHYSICAL THERAPY | Facility: REHABILITATION | Age: 34
End: 2019-01-24
Attending: PREVENTIVE MEDICINE
Payer: COMMERCIAL

## 2019-01-24 DIAGNOSIS — M77.8 TENDINITIS OF RIGHT SHOULDER: ICD-10-CM

## 2019-01-24 DIAGNOSIS — S39.012D LUMBAR SPINE STRAIN, SUBSEQUENT ENCOUNTER: ICD-10-CM

## 2019-01-24 DIAGNOSIS — S16.1XXD NECK MUSCLE STRAIN, SUBSEQUENT ENCOUNTER: ICD-10-CM

## 2019-01-24 PROCEDURE — 97110 THERAPEUTIC EXERCISES: CPT

## 2019-01-24 PROCEDURE — 97140 MANUAL THERAPY 1/> REGIONS: CPT

## 2019-01-24 NOTE — OP THERAPY DAILY TREATMENT
Outpatient Physical Therapy  DAILY TREATMENT     [facility] Outpatient Physical Therapy [place] [address] [facility], [address]  [place] [address]  Phone:  [phone]    Date: 01/24/2019    Patient: Kendra Perez  YOB: 1985  MRN: [id number]     Time Calculation  Start time: 1000  Stop time: 1030 Time Calculation (min): 30 minutes     Chief Complaint: Back Problem    Visit #: 11    SUBJECTIVE:  Upper back, neck shoulders still aching/stiff after computer work - light duty - at work. No problems in low back recently.    OBJECTIVE:  Current objective measures:  P1: L lateral LS.  No pain on L or R LS with standing rotn today.  Ast: Return from R rotn +P1.    * ++ Chad Test L, 1 and 2 jt restrictions.  R WNL.  +tiss tension, L iliacus, anterior hip. // Improved with man therapy.    Standing rotn: Limited L and +ROS.  // Improved ROM after man therapy.    Previous:  R unilat PAs: hypomobile, +ROS into R buttock.  L unilat PAs: Hypomobile, ++tissue tension/guarding.             Therapeutic Exercises (CPT 01124):     1. FR pec stretch, TS extn over FR (perp to spine), 1x12, HEP    2. TB TS roll wall    3. Supine hip flexor stretch, 1 min ea, HEP    4. Stride stance, hip flexor stretch, Review, HEP    6. * Standing chanelle ab, sagittal, Green, 2x10 ea, HEP    7. * Pallof press, Green, 2x10 ea    8. 1/2 FR calf stretch, 2x1 min ea, HEP    11. Chin tucks, 1x5     12. Supine T's + knee drops + chin tucks, 3x ea, 3 rounds    14. * Hip Hinge, 1x10, // +ROS L superior buttock, lateral LS.      Therapeutic Exercise Summary: Previous:  Standing repeated extn  Bridges  Qdpd rock backs  Hk lying knee drops  Open books    * Prone repeated ext  Standing quad stretch  FR pec stretch    Therapeutic Treatments and Modalities:     1. Manual Therapy (CPT 36629), 1. Hk lying STM L iliacus, anterior hip., 3. TS Gr V, 2 levels ea side., // Improved seated TS extn.    Therapeutic Treatment and Modalities Summary: Previous:  2. STM  suboccipitals, R>L.  1. S/Ling LS neutral gaping to open L.  1. Prone STM TS, LS parasp. Gentle TS mobs.  2. S/Ling STM R glute med, deep hip ERs.  Hk lying, bolster, STM roro psoas.  2. Prone STM R QL. // Not tolerated due to +ROS R buttock.    Time-based treatments/modalities:  Manual therapy minutes (CPT 69313): 10 minutes  Therapeutic exercise minutes (CPT 79648): 20 minutes       Pain rating before treatment: 0, mostly stiffness, no pain  Pain rating after treatment: 0    ASSESSMENT:   Response to treatment:   Overall, continues progressing well with less low back pain, however, neck/shoulder/TS stiffness after computer work remains.  Still has mild L low back/superior buttock sharp symptoms with hip hinge, although improved over last 2 weeks.    Will benefit from continued skilled PT to address remaining hip mobility/strength deficits, neural mobility deficits, and trunk strength deficits.    PLAN/RECOMMENDATIONS:   Plan for treatment: therapy treatment to continue next visit.  Planned interventions for next visit: continue with current treatment.

## 2019-01-28 ENCOUNTER — PHYSICAL THERAPY (OUTPATIENT)
Dept: PHYSICAL THERAPY | Facility: REHABILITATION | Age: 34
End: 2019-01-28
Attending: PREVENTIVE MEDICINE
Payer: COMMERCIAL

## 2019-01-28 DIAGNOSIS — M77.8 TENDINITIS OF RIGHT SHOULDER: ICD-10-CM

## 2019-01-28 DIAGNOSIS — S39.012D LUMBAR SPINE STRAIN, SUBSEQUENT ENCOUNTER: ICD-10-CM

## 2019-01-28 DIAGNOSIS — S16.1XXD NECK MUSCLE STRAIN, SUBSEQUENT ENCOUNTER: ICD-10-CM

## 2019-01-28 PROCEDURE — 97140 MANUAL THERAPY 1/> REGIONS: CPT

## 2019-01-28 PROCEDURE — 97110 THERAPEUTIC EXERCISES: CPT

## 2019-01-29 ENCOUNTER — OCCUPATIONAL MEDICINE (OUTPATIENT)
Dept: OCCUPATIONAL MEDICINE | Facility: CLINIC | Age: 34
End: 2019-01-29
Payer: COMMERCIAL

## 2019-01-29 VITALS
TEMPERATURE: 98.4 F | HEIGHT: 66 IN | SYSTOLIC BLOOD PRESSURE: 100 MMHG | WEIGHT: 159 LBS | DIASTOLIC BLOOD PRESSURE: 70 MMHG | BODY MASS INDEX: 25.55 KG/M2 | HEART RATE: 85 BPM | OXYGEN SATURATION: 99 %

## 2019-01-29 DIAGNOSIS — S16.1XXD STRAIN OF NECK MUSCLE, SUBSEQUENT ENCOUNTER: ICD-10-CM

## 2019-01-29 DIAGNOSIS — S39.012D STRAIN OF LUMBAR REGION, SUBSEQUENT ENCOUNTER: ICD-10-CM

## 2019-01-29 PROCEDURE — 99213 OFFICE O/P EST LOW 20 MIN: CPT | Performed by: PREVENTIVE MEDICINE

## 2019-01-29 ASSESSMENT — ENCOUNTER SYMPTOMS: TINGLING: 1

## 2019-01-29 NOTE — PROGRESS NOTES
"Subjective:      Kendra Perez is a 33 y.o. female who presents with Follow-Up (DOi 11/20/19- back- better)      DOI 11/20/2018: 32 yo female presents with low back pain. 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  Patient has noticed some improvement in lower back pain.  However she continues to get pain with prolonged standing and walking and bending.  She also notes continued neck pain which does not seem to have improved much.  She has been doing physical therapy which does seem to help a little bit.  She has 3 visits left.     HPI    Review of Systems   Skin: Negative for itching and rash.   Neurological: Positive for tingling.     SOCHX: Works as an RN at [employer]: No pertinent family history to this problem.       Objective:     /70   Pulse 85   Temp 36.9 °C (98.4 °F)   Ht 1.676 m (5' 6\")   Wt 72.1 kg (159 lb)   SpO2 99%   BMI 25.66 kg/m²      Physical Exam   Constitutional: She is oriented to person, place, and time. She appears well-developed and well-nourished.   Cardiovascular: Normal rate.    Pulmonary/Chest: Effort normal.   Neurological: She is alert and oriented to person, place, and time.   Skin: Skin is warm and dry.   Psychiatric: She has a normal mood and affect. Judgment normal.       Cervical/thoracic: No gross deformity.  Full range of motion with pain.  Upper extremity reflexes intact.  Lumbar: No gross deformity. Full ROM with pain in flexion.  Lower extremity reflexes intact.  Normal gait.       Assessment/Plan:     1. Strain of lumbar region, subsequent encounter  2. Strain of neck muscle, subsequent encounter    Referral for physiatry  Referral for more physical therapy  Continue restricted duty  Follow-up 3 weeks      "

## 2019-01-29 NOTE — LETTER
[facility]  [address] [place] [address],   [address] [address]  Phone:  [phone] - Fax:  [phone]   [facility] Progress Report and Disability Certification  Date of Service: 1/29/2019   No Show:  No  Date / Time of Next Visit: 2/26/2019 @ 4:30pm   Claim Information   Patient Name: Kendra Perez  Claim Number:     Employer: [employer]  Date of Injury: 11/20/2018     Insurer / TPA: Workers [employer]  ID / SSN:     Occupation: Registered Nurse  Diagnosis: Diagnoses of Strain of lumbar region, subsequent encounter and Strain of neck muscle, subsequent encounter were pertinent to this visit.    Medical Information   Related to Industrial Injury? Yes    Subjective Complaints:  DOI 11/20/2018: 32 yo female presents with low back pain. 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  Patient has noticed some improvement in lower back pain.  However she continues to get pain with prolonged standing and walking and bending.  She also notes continued neck pain which does not seem to have improved much.  She has been doing physical therapy which does seem to help a little bit.  She has 3 visits left.   Objective Findings: Cervical/thoracic: No gross deformity.  Full range of motion with pain.  Upper extremity reflexes intact.  Lumbar: No gross deformity. Full ROM with pain in flexion.  Lower extremity reflexes intact.  Normal gait.   Pre-Existing Condition(s):     Assessment:   Condition Same    Status: Additional Care Required  Permanent Disability:No    Plan:      Diagnostics:      Comments:  Referral for physiatry  Referral for more physical therapy  Continue restricted duty  Follow-up 3 weeks      Disability Information   Status: Released to Restricted Duty    From:  1/29/2019  Through: 2/26/2019 Restrictions are: Temporary   Physical Restrictions   Sitting:    Standing:    Stooping:  < or = to 1 hr/day  Bending:  < or = to 1 hr/day   Squatting:    Walking:    Climbing:    Pushing:      Pulling:    Other:    Reaching Above Shoulder (L):   Reaching Above Shoulder (R):       Reaching Below Shoulder (L):    Reaching Below Shoulder (R):      Not to exceed Weight Limits   Carrying(hrs):   Weight Limit(lb): < or = to 10 pounds Lifting(hrs):   Weight  Limit(lb): < or = to 10 pounds   Comments:      Repetitive Actions   Hands: i.e. Fine Manipulations from Grasping:     Feet: i.e. Operating Foot Controls:     Driving / Operate Machinery:     Physician Name: Frankie Felix D.O. Physician Signature: FRANKIE Schwab D.O. e-Signature: Dr. Irving Hughes, Medical Director   Clinic Name / Location: [address],   [address] [place] [address] Clinic Phone Number: Dept: [phone]   Appointment Time: 9:30 Am Visit Start Time: 9:29 AM   Check-In Time:  9:27 Am Visit Discharge Time:  0958am   Original-Treating Physician or Chiropractor    Page 2-Insurer/TPA    Page 3-Employer    Page 4-Employee

## 2019-01-29 NOTE — OP THERAPY DAILY TREATMENT
Outpatient Physical Therapy  DAILY TREATMENT     [facility] Therapy [place] [address] [facility], [address]  [place] [address]  Phone:  [phone]    Date: 01/28/2019    Patient: Kendra Perez  YOB: 1985  MRN: [id number]     Time Calculation  Start time: 1700  Stop time: 1740 Time Calculation (min): 40 minutes     Chief Complaint: Back Problem    Visit #: 11    SUBJECTIVE:  Upper back and neck muscles were better after last session but still sore/stiff. Low back has been doing much better.    OBJECTIVE:  Current objective measures:  P1: L lateral LS.  No pain on L or R LS with standing rotn today.  Ast: Return from R rotn +P1.    * ++ Chad Test L, 1 and 2 jt restrictions.  R WNL.  +tiss tension, L iliacus, anterior hip. // Improved with man therapy.    Standing rotn: Limited L and +ROS.  // Improved ROM after man therapy.    Previous:  R unilat PAs: hypomobile, +ROS into R buttock.  L unilat PAs: Hypomobile, ++tissue tension/guarding.             Therapeutic Exercises (CPT 56210):     1. FR pec stretch, TS extn over FR (perp to spine), Not done 1/28/19    2. TB TS roll wall    3. Supine hip flexor stretch, 1 min ea, HEP    4. Stride stance, hip flexor stretch, Review, HEP    6. * Standing chanelle ab, sagittal, Green, 2x10 ea, Not done 1/28/19    7. * Pallof press, Green, 2x10 ea, Not done 1/28/19    8. 1/2 FR calf stretch, 2x1 min ea, HEP    10. * Supine neutral CS and LS elongation, HEP    11. Chin tucks, 1x5     12. Supine T's + knee drops + chin tucks, 3x ea, 3 rounds    14. * Hip Hinge, 1x10, Not done 1/28/19      Therapeutic Exercise Summary: Previous:  Standing repeated extn  Bridges  Qdpd rock backs  Hk lying knee drops  Open books    * Prone repeated ext  Standing quad stretch  FR pec stretch    Therapeutic Treatments and Modalities:     1. Manual Therapy (CPT 41406), 1. S/Ling LS neutral gaping manip, contr-relax, 2x ea side., // Improved ease with seated rotn., 2. TS Gr V, 2 levels ea  side., 3. Gentle STM R subocc; OA mobs., +tiss restrictions on R c limited OA mobilty., * 4. S/Ling STM R medial scap border.  Supine STM R pecs, clavicular fibers. Posterior R GHJ mobs.    Therapeutic Treatment and Modalities Summary: Previous:  1. Hk lying STM L iliacus, anterior hip.  2. STM suboccipitals, R>L.  1. Prone STM TS, LS parasp. Gentle TS mobs.  2. S/Ling STM R glute med, deep hip ERs.  Hk lying, bolster, STM roro psoas.  2. Prone STM R QL. // Not tolerated due to +ROS R buttock.    Time-based treatments/modalities:  Manual therapy minutes (CPT 51498): 15 minutes  Therapeutic exercise minutes (CPT 59252): 25 minutes       Pain rating before treatment: 0, mostly stiffness, no pain  Pain rating after treatment: 0    ASSESSMENT:   Response to treatment:   Overall, continues progressing well with minimal to no low back pain symptoms. R shoulder stiffness contributing to CS/Upper trap symptoms. Neck/shoulder/TS stiffness responded very well to interventions today; improved TS and CS rotn at end of session. Demo'd good technique with HEP.    Will benefit from continued skilled PT to address remaining hip mobility/strength deficits, neural mobility deficits, and trunk strength deficits.    PLAN/RECOMMENDATIONS:   Plan for treatment: therapy treatment to continue next visit.  Planned interventions for next visit: continue with current treatment.

## 2019-01-30 ENCOUNTER — PHYSICAL THERAPY (OUTPATIENT)
Dept: PHYSICAL THERAPY | Facility: REHABILITATION | Age: 34
End: 2019-01-30
Attending: PREVENTIVE MEDICINE
Payer: COMMERCIAL

## 2019-01-30 DIAGNOSIS — S39.012D LUMBAR SPINE STRAIN, SUBSEQUENT ENCOUNTER: ICD-10-CM

## 2019-01-30 DIAGNOSIS — M77.8 TENDINITIS OF RIGHT SHOULDER: ICD-10-CM

## 2019-01-30 DIAGNOSIS — S16.1XXD NECK MUSCLE STRAIN, SUBSEQUENT ENCOUNTER: ICD-10-CM

## 2019-01-30 PROCEDURE — 97110 THERAPEUTIC EXERCISES: CPT

## 2019-01-30 PROCEDURE — 97140 MANUAL THERAPY 1/> REGIONS: CPT

## 2019-01-30 NOTE — OP THERAPY DAILY TREATMENT
"  Outpatient Physical Therapy  DAILY TREATMENT     [facility] Outpatient Physical Therapy [place]  [address] [facility], [address]  [place] [address]  Phone:  [phone]    Date: 01/30/2019    Patient: Kendra Perez  YOB: 1985  MRN: [id number]     Time Calculation  Start time: 1530  Stop time: 1605 Time Calculation (min): 35 minutes     Chief Complaint: Back Problem and Neck Problem    Visit #: 12    SUBJECTIVE:  Neck felt better for the day after last session, but the next morning it was extremely sore, \"felt like my head was going to explode.\" It is feeling better today. Back and leg pain comes and goes but overall getting better.    OBJECTIVE:  Current objective measures:  P1: L lateral LS.  No pain on L or R LS with standing rotn today.  Ast: Return from R rotn +P1.    * ++ Chad Test L, 1 and 2 jt restrictions.  R WNL.  +tiss tension, L iliacus, anterior hip. // Improved with man therapy.    Standing rotn: Limited L and +ROS.  // Improved ROM after man therapy.    Previous:  R unilat PAs: hypomobile, +ROS into R buttock.  L unilat PAs: Hypomobile, ++tissue tension/guarding.             Therapeutic Exercises (CPT 64702):     1. FR pec stretch, TS extn over FR (perp to spine), HEP    2. TB TS roll wall    3. Supine hip flexor stretch, 1 min ea, HEP    4. Stride stance, hip flexor stretch, Review, HEP    6. * Standing chanelle ab, sagittal, Green, 2x10 ea, Not done 1/30/19    7. * Pallof press, Green, 2x10 ea, Not done 1/30/19    8. 1/2 FR calf stretch, 2x1 min ea, HEP    10. * Supine neutral CS and LS elongation, HEP    11. Chin tucks, 1x5     12. Supine T's + knee drops + chin tucks, 3x ea, 3 rounds    14. * Hip Hinge, 1x10, Not done 1/30/19      Therapeutic Exercise Summary: Previous:  Standing repeated extn  Bridges  Qdpd rock backs  Hk lying knee drops  Open books    * Prone repeated ext  Standing quad stretch  FR pec stretch    Therapeutic Treatments and Modalities:     1. Manual Therapy (CPT 85190), 1. Hk " lying STM roro iliacus, anterior hip., 2. Prone STM TS, LS parasp. Gentle TS mobs., // Abolition of R buttock and LE symptoms.    Therapeutic Treatment and Modalities Summary: Previous:  1. S/Ling LS neutral gaping manip, contr-relax, 2x ea side.  2. TS Gr V, 2 levels ea side.  2. STM suboccipitals, R>L.  3. Gentle STM R subocc; OA mobs.  * 4. S/Ling STM R medial scap border.  Supine STM R pecs, clavicular fibers. Posterior R GHJ mobs.    2. S/Ling STM R glute med, deep hip ERs.  Hk lying, bolster, STM roro psoas.  2. Prone STM R QL. // Not tolerated due to +ROS R buttock.    Time-based treatments/modalities:  Manual therapy minutes (CPT 72215): 20 minutes  Therapeutic exercise minutes (CPT 28633): 15 minutes       Pain rating before treatment: 3  Pain rating after treatment: 0    ASSESSMENT:   Response to treatment:   R buttock/posterior symptoms relieved with interventions directed at spinal soft tissue and joint restrictions. Demo'd good technique with HEP.    Will benefit from continued skilled PT to address remaining hip mobility/strength deficits, neural mobility deficits, and trunk strength deficits.    PLAN/RECOMMENDATIONS:   Plan for treatment: therapy treatment to continue next visit.  Planned interventions for next visit: continue with current treatment.

## 2019-02-04 ENCOUNTER — PHYSICAL THERAPY (OUTPATIENT)
Dept: PHYSICAL THERAPY | Facility: REHABILITATION | Age: 34
End: 2019-02-04
Attending: PREVENTIVE MEDICINE
Payer: COMMERCIAL

## 2019-02-04 DIAGNOSIS — S16.1XXD NECK MUSCLE STRAIN, SUBSEQUENT ENCOUNTER: ICD-10-CM

## 2019-02-04 DIAGNOSIS — S39.012D LUMBAR SPINE STRAIN, SUBSEQUENT ENCOUNTER: ICD-10-CM

## 2019-02-04 DIAGNOSIS — M77.8 TENDINITIS OF RIGHT SHOULDER: ICD-10-CM

## 2019-02-04 PROCEDURE — 97110 THERAPEUTIC EXERCISES: CPT

## 2019-02-04 PROCEDURE — 97140 MANUAL THERAPY 1/> REGIONS: CPT

## 2019-02-05 NOTE — OP THERAPY DAILY TREATMENT
Outpatient Physical Therapy  DAILY TREATMENT     [facility] Outpatient Physical Therapy [place] [address] [address] [facility], [address]  [place] [address]  Phone:  [phone]    Date: 02/04/2019    Patient: Kendra Perez  YOB: 1985  MRN: [id number]     Time Calculation  Start time: 1600  Stop time: 1640 Time Calculation (min): 40 minutes     Chief Complaint: Back Problem and Neck Problem    Visit #: 13    SUBJECTIVE:  Overall, things have been feeling a little better; tolerance for sitting is increasing. She is now sitting most of her work day.    OBJECTIVE:  Current objective measures:  P1: L lateral LS.  No pain on L or R LS with standing rotn today.  Ast: Return from R rotn +P1.    *Standing hip hinge: mild +ROS L LS; R pain free. Much improved over last 2 weeks.    * +Hcad Test L, 1 and 2 jt restrictions.  R WNL.  + mild tiss tension, L iliacus, anterior hip    Standing rotn: L pain free. Mild +ROS R rotn.   // Improved ROM after man therapy.    Previous:  R unilat PAs: hypomobile, +ROS into R buttock.  L unilat PAs: Hypomobile, ++tissue tension/guarding.             Therapeutic Exercises (CPT 18621):     1. FR pec stretch, HEP    2. TB TS roll wall    3. FR quads, anterior hips, HEP  // Further improved standing rotn.    4. Supine hip flexor stretch, 1 min ea, HEP    5. Stride stance, hip flexor stretch    6. * Standing chanelle ab, sagittal, Green, 2x10 ea, HEP    7. * Pallof press, Green, 2x10 ea, HEP    8. 1/2 FR calf stretch, 2x1 min ea, HEP    10. * Supine neutral CS and LS elongation, HEP    11. Chin tucks, 1x5     12. Supine T's + knee drops + chin tucks, 3x ea, 3 rounds, Not done 2/4/19    14. * Hip Hinge, 2x10      Therapeutic Exercise Summary: Previous:  Standing repeated extn  Bridges  Qdpd rock backs  Hk lying knee drops  Open books    * Prone repeated ext  Standing quad stretch  FR pec stretch    Therapeutic Treatments and Modalities:     1. Manual Therapy (CPT 63251), 1. Hk lying STM roro iliacus,  anterior hip., 2. Prone STM TS, LS parasp. Gentle TS mobs., // Abolition of R buttock and LE symptoms.    Therapeutic Treatment and Modalities Summary: Previous:  1. S/Ling LS neutral gaping manip, contr-relax, 2x ea side.  2. TS Gr V, 2 levels ea side.  2. STM suboccipitals, R>L.  3. Gentle STM R subocc; OA mobs.  * 4. S/Ling STM R medial scap border.  Supine STM R pecs, clavicular fibers. Posterior R GHJ mobs.    2. S/Ling STM R glute med, deep hip ERs.  Hk lying, bolster, STM roro psoas.  2. Prone STM R QL. // Not tolerated due to +ROS R buttock.    Time-based treatments/modalities:  Manual therapy minutes (CPT 18695): 15 minutes  Therapeutic exercise minutes (CPT 49104): 25 minutes       Pain rating before treatment: 3  Pain rating after treatment: 0    ASSESSMENT:   Response to treatment:   R low back symptoms relieved with interventions directed at spinal soft tissue and joint restrictions. More tissue restrictions noted today, likely due to sitting on hard surfaces over wkend without LS support. Demo'd good technique with HEP. Tolerated hip hinge well with minimal discomfort.    Will benefit from continued skilled PT to address remaining hip mobility/strength deficits, neural mobility deficits, and trunk strength deficits.    PLAN/RECOMMENDATIONS:   Plan for treatment: therapy treatment to continue next visit.  Planned interventions for next visit: continue with current treatment.

## 2019-02-06 ENCOUNTER — PHYSICAL THERAPY (OUTPATIENT)
Dept: PHYSICAL THERAPY | Facility: REHABILITATION | Age: 34
End: 2019-02-06
Attending: PREVENTIVE MEDICINE
Payer: COMMERCIAL

## 2019-02-06 DIAGNOSIS — S39.012D LUMBAR SPINE STRAIN, SUBSEQUENT ENCOUNTER: ICD-10-CM

## 2019-02-06 DIAGNOSIS — S16.1XXD NECK MUSCLE STRAIN, SUBSEQUENT ENCOUNTER: ICD-10-CM

## 2019-02-06 DIAGNOSIS — M77.8 TENDINITIS OF RIGHT SHOULDER: ICD-10-CM

## 2019-02-06 PROCEDURE — 97140 MANUAL THERAPY 1/> REGIONS: CPT

## 2019-02-06 PROCEDURE — 97110 THERAPEUTIC EXERCISES: CPT

## 2019-02-07 NOTE — OP THERAPY DAILY TREATMENT
Outpatient Physical Therapy  DAILY TREATMENT     [facility] Physical Therapy [place] [address] [email or web address] [facility], [address]  [place] [address]  Phone:  [phone]    Date: 02/06/2019    Patient: Kendra Perez  YOB: 1985  MRN: [id number]     Time Calculation  Start time: 1630  Stop time: 1700 Time Calculation (min): 30 minutes     Chief Complaint: Back Problem    Visit #: 14    SUBJECTIVE:  Felt fine after last session. 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    OBJECTIVE:  Current objective measures:  P1: L lateral LS.  No pain on L or R LS with standing rotn today.  Ast: Return from R rotn +P1.    *Standing hip hinge: mild +ROS L LS; R pain free. Much improved over last 2 weeks.    * +Chad Test L, 1 and 2 jt restrictions.  R WNL.  + mild tiss tension, L iliacus, anterior hip    Standing rotn: L pain free. Mild +ROS R rotn.   // Improved ROM after man therapy.    Previous:  R unilat PAs: hypomobile, +ROS into R buttock.  L unilat PAs: Hypomobile, ++tissue tension/guarding.             Therapeutic Exercises (CPT 86153):     1. Standing repeated ext, 2x10, HEP  // Less back pain; no change in R LE.    2. Prone hip ext, 2x10, HEP  // Decr pain intensity; no change in distribution.    3. FR quads, anterior hips    4. Supine hip flexor stretch, // Not done 2/6/19.    5. Stride stance, hip flexor stretch, 2x30 sec ea    6. 1/2 FR calf stretch, 2x30 sec ea, HEP    7. * Standing chanelle ab, sagittal, Green, 2x10 ea, HEP    8. * Pallof press, Green, 2x10 ea, HEP    9. * Hip Hinge, 1x5, // Mild tightness along L LS, but no pain.    10. * Ball wall squats, 2x12, // No discomfort, but challenging for legs.    11. FR pec stretch    12. * Supine neutral CS and LS elongation, HEP    13. Chin tucks, 1x5 , Not done 2/6/19    14. Supine T's + knee drops + chin tucks, 3x ea, 3 rounds, Not done 2/6/19    15. TB TS roll wall, Not done 2/6/19    Therapeutic Treatments and  Modalities:     1. Manual Therapy (CPT 40092), 1. Supine (pillow under hips) STM R iliacus, anterior hip., 2. Supine STM R anterior hip., // Decr R LE symptoms., 3. S/Ling PROM R hip ext/abd, // Further decr R LE.    Therapeutic Treatment and Modalities Summary: Previous:  1. S/Ling LS neutral gaping manip, contr-relax, 2x ea side.  2. Prone STM TS, LS parasp. Gentle TS mobs.  2. TS Gr V, 2 levels ea side.  2. STM suboccipitals, R>L.  3. Gentle STM R subocc; OA mobs.  * 4. S/Ling STM R medial scap border.  Supine STM R pecs, clavicular fibers. Posterior R GHJ mobs.    2. S/Ling STM R glute med, deep hip ERs.  Hk lying, bolster, STM roro psoas.  2. Prone STM R QL. // Not tolerated due to +ROS R buttock.    Time-based treatments/modalities:  Manual therapy minutes (CPT 83860): 10 minutes  Therapeutic exercise minutes (CPT 13574): 20 minutes       Pain rating before treatment: 3  Pain rating after treatment: 0    ASSESSMENT:   Response to treatment:   R low back symptoms relieved with interventions directed at hip soft tissue restrictions. Exercise and activity tolerance improving gradually. Tolerated hip hinge and wall squats without discomfort. Demo'd good technique with HEP.    Will benefit from continued skilled PT to address remaining hip mobility/strength deficits, neural mobility deficits, and trunk strength deficits.    PLAN/RECOMMENDATIONS:   Plan for treatment: therapy treatment to continue next visit.  Planned interventions for next visit: continue with current treatment.

## 2019-02-11 ENCOUNTER — PHYSICAL THERAPY (OUTPATIENT)
Dept: PHYSICAL THERAPY | Facility: REHABILITATION | Age: 34
End: 2019-02-11
Attending: PREVENTIVE MEDICINE
Payer: COMMERCIAL

## 2019-02-11 DIAGNOSIS — S16.1XXD NECK MUSCLE STRAIN, SUBSEQUENT ENCOUNTER: ICD-10-CM

## 2019-02-11 DIAGNOSIS — M77.8 TENDINITIS OF RIGHT SHOULDER: ICD-10-CM

## 2019-02-11 DIAGNOSIS — S39.012D LUMBAR SPINE STRAIN, SUBSEQUENT ENCOUNTER: ICD-10-CM

## 2019-02-11 PROCEDURE — 97140 MANUAL THERAPY 1/> REGIONS: CPT

## 2019-02-11 PROCEDURE — 97110 THERAPEUTIC EXERCISES: CPT
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
## 2019-02-15 ENCOUNTER — PHYSICAL THERAPY (OUTPATIENT)
Dept: PHYSICAL THERAPY | Facility: REHABILITATION | Age: 34
End: 2019-02-15
Attending: PREVENTIVE MEDICINE
Payer: COMMERCIAL

## 2019-02-15 DIAGNOSIS — S39.012D LUMBAR SPINE STRAIN, SUBSEQUENT ENCOUNTER: ICD-10-CM

## 2019-02-15 PROCEDURE — 97140 MANUAL THERAPY 1/> REGIONS: CPT

## 2019-02-15 PROCEDURE — 97110 THERAPEUTIC EXERCISES: CPT

## 2019-02-15 NOTE — OP THERAPY DAILY TREATMENT
Outpatient Physical Therapy  DAILY TREATMENT     [facility] Physical Therapy [address]  [place] [address]  Phone:  [phone]    Date: 02/15/2019    Patient: Kendra Perez  YOB: 1985  MRN: [id number]     Time Calculation  Start time: 0900  Stop time: 0930 Time Calculation (min): 30 minutes     Chief Complaint: Neck Problem    Visit #: 16    SUBJECTIVE:  Low back has been feeling a lot better since last session - recovered from fall in snow. Upper back and neck are still uncomfortable.    OBJECTIVE:  Current objective measures:  P1: roro upper traps/base of neck  Ast: L CS rotn    *Standing hip hinge: mild +ROS L LS; R pain free.     + tiss tension, L iliacus, anterior hip    Standing rotn: L pain free. Mild +ROS R rotn.   // Improved ROM after man therapy.             Therapeutic Exercises (CPT 94783):     1. FR quads, anterior hips, calves, HS, HEP    2. Supine hip flexor stretch    3. FR pec stretch, HEP    4. 1/2 FR calf stretch, Not done 2/15/19.    7. * Standing chanelle ab, sagittal, Green, 2x10 ea, Not done 2/15/19.    8. * Pallof press, Green, 2x10 ea, Not done 2/15/19.    9. * Hip Hinge, 1x5, Not done 2/15/19.    10. Chin tucks, HEP    11. Supine T's + knee drops + chin tucks, HEP    12. TB TS roll wall, HEP      Therapeutic Exercise Summary: Previous:  * Ball wall squats  Prone hip ext  Stride stance, hip flexor stretch    Therapeutic Treatments and Modalities:     1. Manual Therapy (CPT 01742), 1. Prone STM TS, TS mobs., // No significant change in TS or CS rotn., 2. TS GrV, roro, C4-5, 5-6., // Improved CS rotn ROM, but still R CS stretching/discomfort., 3. Supine STM R suboc, gentle R OA mobs., // Improved TS and CS rotn ROM, significant decr in R CS tightness., 4. R GHJ gentle AP mobs.    Therapeutic Treatment and Modalities Summary: Previous:  1. S/Ling LS neutral gaping manip, contr-relax, 2x ea side.  2. Gentle TS mobs.  2. TS Gr V, 2 levels ea side.  2. STM  suboccipitals, R>L.  3. Gentle STM R subocc; OA mobs.  * 4. S/Ling STM R medial scap border.  Supine STM R pecs, clavicular fibers. Posterior R GHJ mobs.    2. S/Ling STM R glute med, deep hip ERs.  Hk lying, bolster, STM roro psoas.  2. Prone STM R QL. // Not tolerated due to +ROS R buttock.    Time-based treatments/modalities:  Manual therapy minutes (CPT 32732): 15 minutes  Therapeutic exercise minutes (CPT 62804): 15 minutes       Pain rating before treatment: 5  Pain rating after treatment: 5    ASSESSMENT:   Response to treatment:   Low back quite sore from fall over the weekend. Lower tolerance for abdominal exercises with rotation today. Focused on hip and TS mobility; no significant change in LBP at end of session.    Will benefit from continued skilled PT to address remaining hip mobility/strength deficits, neural mobility deficits, and trunk strength deficits.    PLAN/RECOMMENDATIONS:   Plan for treatment: therapy treatment to continue next visit.  Planned interventions for next visit: continue with current treatment.

## 2019-02-18 ENCOUNTER — PHYSICAL THERAPY (OUTPATIENT)
Dept: PHYSICAL THERAPY | Facility: REHABILITATION | Age: 34
End: 2019-02-18
Attending: PREVENTIVE MEDICINE
Payer: COMMERCIAL

## 2019-02-18 DIAGNOSIS — S16.1XXD NECK MUSCLE STRAIN, SUBSEQUENT ENCOUNTER: ICD-10-CM

## 2019-02-18 DIAGNOSIS — S39.012D LUMBAR SPINE STRAIN, SUBSEQUENT ENCOUNTER: ICD-10-CM

## 2019-02-18 DIAGNOSIS — M77.8 TENDINITIS OF RIGHT SHOULDER: ICD-10-CM

## 2019-02-18 PROCEDURE — 97110 THERAPEUTIC EXERCISES: CPT

## 2019-02-18 PROCEDURE — 97140 MANUAL THERAPY 1/> REGIONS: CPT

## 2019-02-19 NOTE — OP THERAPY DAILY TREATMENT
Outpatient Physical Therapy  DAILY TREATMENT     [facility] Outpatient Physical Therapy [place] [address] [facility] ([place], [place]), [address]  [place] [address]  Phone:  [phone]    Date: 02/18/2019    Patient: Kendra Perez  YOB: 1985  MRN: [id number]     Time Calculation  Start time: 1700  Stop time: 1740 Time Calculation (min): 40 minutes     Chief Complaint: Back Problem    Visit #: 17    SUBJECTIVE:  Still having intermittent R buttock and lateral hip pain. This was better but has been returning recently. This past Friday (4 days ago), she stood a lot at work - longest period so far. Saturday was ok, but symptoms woke her up Sat and Sun night.     OBJECTIVE:  Current objective measures:  P1: roro upper traps/base of neck  Ast: L CS rotn    * +TTP R psoas/iliacus, c +ROS postero-lateral buttock. // -ROS after STM.  + tiss tension, L iliacus, anterior hip    Standing rotn: L pain free. Mild +ROS R rotn.   // Improved ROM after man therapy.             Therapeutic Exercises (CPT 53148): , HEP    2. Supine hip flexor stretch, 2x1 min ea, HEP    3. Swiss ball rolls, 1x12    4. 1/2 long sit HS/add stretch, 2x30 sec ea, c ankle AROM, HEP    5. Swiss ball rolls, round 2, 1x12    6. FR quads, anterior hips, calves, HS, HEP    7. FR pec stretch + chin tuck + T's, Green, HEP    8. * Standing chanelle ab, sagittal, Green, 1x10 ea. Add'l practice reps.    9. * Pallof press, Green, 1x10 ea. Add'l practice reps.    10. * Hip Hinge, 1x5, Not done 2/18/19.    11. Unilat rows, split stance, Purple, 2x15 ea, add'l practice., HEP    13. TB TS roll wall, Not done 2/18/19.    14. 1/2 FR calf stretch      Therapeutic Exercise Summary: Previous:  * Ball wall squats  Prone hip ext  Stride stance, hip flexor stretch  Supine T's + knee drops + chin tucks    Therapeutic Treatments and Modalities:     1. Manual Therapy (CPT 18988), 1. Hk lying STM roro iliacus, psoas., // Decr symptoms R LS., 2. Supine STM R anterior hip. Gentle AP mobs., // Further decr  in symp, improved standing rotn., 3. Supine STM R adductors., // +TTP, tissue restrictions.    Therapeutic Treatment and Modalities Summary: Previous:  1. Prone STM TS, TS mobs. 2. TS GrV, roro, C4-5, 5-6.3. Supine STM R suboc, gentle R OA mobs.4. R GHJ gentle AP mobs.  1. S/Ling LS neutral gaping manip, contr-relax, 2x ea side.  2. STM suboccipitals, R>L.  * 4. S/Ling STM R medial scap border.  Supine STM R pecs, clavicular fibers. Posterior R GHJ mobs.    2. S/Ling STM R glute med, deep hip ERs.  Hk lying, bolster, STM roro psoas.  2. Prone STM R QL. // Not tolerated due to +ROS R buttock.    Time-based treatments/modalities:  Manual therapy minutes (CPT 10891): 15 minutes  Therapeutic exercise minutes (CPT 38735): 25 minutes       Pain rating before treatment: 4  Pain rating after treatment: 1    ASSESSMENT:   Response to treatment:   Recent flare up likely due to long periods of standing/walking at work last week. R anterior hip and R quad soft tissue restrictions contributing to hip mobility deficits and excessive stress through R LS. Significant reduction in symptoms by end of session today.    Will benefit from continued skilled PT to address remaining hip mobility/strength deficits, neural mobility deficits, and trunk strength deficits.    PLAN/RECOMMENDATIONS:   Plan for treatment: therapy treatment to continue next visit.  Planned interventions for next visit: continue with current treatment.

## 2019-02-21 ENCOUNTER — PHYSICAL THERAPY (OUTPATIENT)
Dept: PHYSICAL THERAPY | Facility: REHABILITATION | Age: 34
End: 2019-02-21
Attending: PREVENTIVE MEDICINE
Payer: COMMERCIAL

## 2019-02-21 DIAGNOSIS — S39.012D LUMBAR SPINE STRAIN, SUBSEQUENT ENCOUNTER: ICD-10-CM

## 2019-02-21 DIAGNOSIS — M77.8 TENDINITIS OF RIGHT SHOULDER: ICD-10-CM

## 2019-02-21 DIAGNOSIS — S16.1XXD NECK MUSCLE STRAIN, SUBSEQUENT ENCOUNTER: ICD-10-CM

## 2019-02-21 PROCEDURE — 97110 THERAPEUTIC EXERCISES: CPT

## 2019-02-21 PROCEDURE — 97140 MANUAL THERAPY 1/> REGIONS: CPT

## 2019-02-21 NOTE — OP THERAPY DAILY TREATMENT
Outpatient Physical Therapy  DAILY TREATMENT     [facility] Outpatient Physical Therapy [place]  [address], [address]  [place] [address]  Phone:  [phone]    Date: 02/21/2019    Patient: Kendra Perez  YOB: 1985  MRN: [id number]     Time Calculation  Start time: 1430  Stop time: 1505 Time Calculation (min): 35 minutes     Chief Complaint: Back Problem and Neck Problem    Visit #: 18    SUBJECTIVE:  Low back has been feeling better. A little sore from rowing exercises last time. Still very stiff along upper back and neck; this is now primary concern.     OBJECTIVE:  Current objective measures:  P1: roro upper traps/base of neck  Ast: L CS rotn    * R CS upglides, hypomobile C4-C7 levels.    * +TTP R psoas/iliacus, c +ROS postero-lateral buttock. // -ROS after STM.  + tiss tension, L iliacus, anterior hip    Standing rotn: L pain free. Mild +ROS R rotn.   // Improved ROM after man therapy.             Therapeutic Exercises (CPT 91946): , HEP    2. Supine hip flexor stretch, 2x1 min ea, HEP    3. Swiss ball rolls, 1x12    4. 1/2 long sit HS/add stretch, 2x30 sec ea, c ankle AROM, HEP    5. Swiss ball rolls, round 2, 1x12    6. FR quads, anterior hips, calves, HS, HEP    7. FR pec stretch + chin tuck + T's, Green, HEP    8. * Standing chanelle ab, sagittal, Green, review.    9. * Pallof press, Green, review.    11. Unilat rows, split stance, Purple, 2x15 ea, add'l practice., HEP    13. TB TS roll wall, Not done 2/18/19.    14. 1/2 FR calf stretch    16. *R GHJ ER, Green, 3xfatigue, HEP    17. * Self-STM R shoulder anterior incisions, HEP      Therapeutic Exercise Summary: Previous:  * Ball wall squats  Prone hip ext  Stride stance, hip flexor stretch  Supine T's + knee drops + chin tucks    Therapeutic Treatments and Modalities:     1. Manual Therapy (CPT 28323), 1. STM R subocc, OA mobs. R lower CS upglides., // Near abolition of L CS pain c rotn., 2. STM R anterior shoulder incision, // Improved supine  shoulder flexion AROM., 3. STM R pec major infero-lateral border, c arm flx., // Minimal change., 4. STM posterior incision + abd/ER., // Further imprvmt in shoulder flx AROM.    Therapeutic Treatment and Modalities Summary: Previous:  1. Prone STM TS, TS mobs. 2. TS GrV, roro, C4-5, 5-6.3. Supine STM R suboc, gentle R OA mobs.4. R GHJ gentle AP mobs.  1. S/Ling LS neutral gaping manip, contr-relax, 2x ea side.  2. STM suboccipitals, R>L.  * 4. S/Ling STM R medial scap border.  Supine STM R pecs, clavicular fibers. Posterior R GHJ mobs.    2. S/Ling STM R glute med, deep hip ERs.  Hk lying, bolster, STM roro psoas.  2. Prone STM R QL. // Not tolerated due to +ROS R buttock.    Time-based treatments/modalities:  Manual therapy minutes (CPT 51346): 10 minutes  Therapeutic exercise minutes (CPT 14618): 20 minutes       Pain rating before treatment: 3  Pain rating after treatment: 1    ASSESSMENT:   Response to treatment:   Pt continues progressing well with decreased low back symptoms, however CS and R shoulder restrictions still present and contributing to neck/shoulder symptoms. Significant reduction in symptoms by end of session today. Good tolerance for shoulder exercises but R GHJ ER strength/endurance deficit noted.    Will benefit from continued skilled PT to address remaining hip mobility/strength deficits, neural mobility deficits, and trunk strength deficits.    PLAN/RECOMMENDATIONS:   Plan for treatment: therapy treatment to continue next visit.  Planned interventions for next visit: continue with current treatment.

## 2019-03-04 ENCOUNTER — PHYSICAL THERAPY (OUTPATIENT)
Dept: PHYSICAL THERAPY | Facility: REHABILITATION | Age: 34
End: 2019-03-04
Attending: PREVENTIVE MEDICINE
Payer: COMMERCIAL

## 2019-03-04 DIAGNOSIS — M77.8 TENDINITIS OF RIGHT SHOULDER: ICD-10-CM

## 2019-03-04 DIAGNOSIS — S16.1XXD NECK MUSCLE STRAIN, SUBSEQUENT ENCOUNTER: ICD-10-CM

## 2019-03-04 DIAGNOSIS — S39.012D LUMBAR SPINE STRAIN, SUBSEQUENT ENCOUNTER: ICD-10-CM

## 2019-03-04 PROCEDURE — 97110 THERAPEUTIC EXERCISES: CPT

## 2019-03-04 NOTE — OP THERAPY DAILY TREATMENT
Outpatient Physical Therapy  DAILY TREATMENT     [facility] Outpatient Physical Therapy [place] [id number] [facility], [address]  [place] [address]  Phone:  [phone]    Date: 03/04/2019    Patient: Kendra Perez  YOB: 1985  MRN: [id number]     Time Calculation  Start time: 0900  Stop time: 0935 Time Calculation (min): 35 minutes     Chief Complaint: Back Problem    Visit #: 19    SUBJECTIVE:  Overall, back pain is better than prior to PT. Neck was very sore last week but is feeling much better today. May be having epidural injection. Stretches help, but each morning symptoms are about same. Pt states she is comfortable with HEP and with DC from PT today.    OBJECTIVE:  Current objective measures:  P1: R buttock and lateral thigh  P2: roro upper traps/base of neck  Ast: L CS rotn - no change in symptoms today. R rotn symmetric with L.    + tiss tension, R iliacus and psoas     Neck Disability Total: 14  Francis Rakesh Low Back Pain and Disability Score: 25       Therapeutic Exercises (CPT 39323):     1. Piriformis stretch    2. Supine hip flexor stretch, 2x1 min ea, HEP    3. SKTC stretch.  Active HS stretch, 2x10    4. Hip hinge + heel slide, 3x10, HEP    5. Hip hinge, Hip hinge + low row, Purple 1x10, HEP  // Challenging for quads.    6. 1/2 FR calf stretch, HEP    7. FR quads, anterior hips, calves, HS    8. * Standing chanelle ab, sagittal, Green, review.    9. * Pallof press, Green, review.    Therapeutic Treatments and Modalities:     1. Manual Therapy (CPT 95994), 1. S/Ling STM R glute med/min, TFL., // Significant decr P1., 2. FR R anterior hip/thigh., // Improved hip ext ROM, decr P1 walking.    Therapeutic Treatment and Modalities Summary: Previous:  2. Hk lying STM R adductors.  4. Prone R unilat PAs, Gr III 2 rounds; STM R QL  1. Prone STM TS, TS mobs. 2. TS GrV, roro, C4-5, 5-6.3. Supine STM R suboc, gentle R OA mobs.4. R GHJ gentle AP mobs.  1. S/Ling LS neutral gaping manip, contr-relax, 2x ea side.  2.  STM suboccipitals, R>L.  * 4. S/Ling STM R medial scap border.  Supine STM R pecs, clavicular fibers. Posterior R GHJ mobs.    2. S/Ling STM R glute med, deep hip ERs.  Hk lying, bolster, STM roro psoas.  2. Prone STM R QL. // Not tolerated due to +ROS R buttock.    Time-based treatments/modalities:  Manual therapy minutes (CPT 00576): 10 minutes  Therapeutic exercise minutes (CPT 19165): 25 minutes       Pain rating before treatment: 3  Pain rating after treatment: 1    ASSESSMENT:   Response to treatment:   Demonstrated very good technique with HEP today with minimal cuing. Still demonstrates some restrictions in R hip extn mobility and R calf DF which are impacting gait mechanics, however, pt is now competent with HEP to continue working on these independently.    PLAN/RECOMMENDATIONS:   Plan for treatment: discharge patient due to accomplished goals.

## 2019-03-04 NOTE — OP THERAPY DISCHARGE SUMMARY
Outpatient Physical Therapy  DISCHARGE SUMMARY NOTE      [facility] Physical Therapy [place] [address] [facility], [address]  [place] [address]  Phone:  [phone]    Date of Visit: 03/04/2019    Patient: Kendra Perez  YOB: 1985  MRN: [id number]     Referring Provider: Frankie Felix D.O.  [address] [place] [address]  [place] [id number]   Referring Diagnosis Strain of muscle, fascia and tendon of lower back, subsequent encounter [S39.012D]     Physical Therapy Occurrence Codes    Date of onset of impairment:  11/20/18   Date physical therapy care plan established or reviewed:  12/10/18   Date physical therapy treatment started:  12/10/18          Functional Limitations and Severity Modifiers  Neck Disability Total: 14  Francis Rakesh Low Back Pain and Disability Score: 25       Your patient is being discharged from Physical Therapy with the following comments:   · Goals partially met    Comments:  Overall, the patient has made good progress with PT. She reports reduction in LS and leg pain intensity, however, some low level symptoms remain. She has made good progress with hip mobility and trunk strengthening, and now demonstrates proper technique with her HEP with minimal to no cuing. She states she is comfortable continuing to work on remaining impairments independently and with DC from PT today.      Recommendations:  DC from PT today.    Clint Catherine, PT, DPT, OCS    Date: 3/4/2019

## 2019-03-20 ENCOUNTER — HOSPITAL ENCOUNTER (OUTPATIENT)
Dept: LAB | Facility: MEDICAL CENTER | Age: 34
End: 2019-03-20
Attending: OBSTETRICS & GYNECOLOGY
Payer: COMMERCIAL

## 2019-03-20 PROCEDURE — 87624 HPV HI-RISK TYP POOLED RSLT: CPT

## 2019-03-20 PROCEDURE — 88175 CYTOPATH C/V AUTO FLUID REDO: CPT

## 2019-03-22 LAB
CYTOLOGY REG CYTOL: ABNORMAL
HPV HR 12 DNA CVX QL NAA+PROBE: POSITIVE
HPV16 DNA SPEC QL NAA+PROBE: NEGATIVE
HPV18 DNA SPEC QL NAA+PROBE: NEGATIVE
SPECIMEN SOURCE: ABNORMAL

## 2019-06-18 ENCOUNTER — HOSPITAL ENCOUNTER (OUTPATIENT)
Facility: MEDICAL CENTER | Age: 34
End: 2019-06-18
Payer: COMMERCIAL

## 2019-06-18 LAB
BDY FAT % MEASURED: 34.3 %
BP DIAS: 61 MMHG
BP SYS: 113 MMHG
CHOLEST SERPL-MCNC: 153 MG/DL (ref 100–199)
DIABETES HTDIA: NO
EVENT NAME HTEVT: NORMAL
FASTING HTFAS: YES
GLUCOSE SERPL-MCNC: 94 MG/DL (ref 65–99)
HDLC SERPL-MCNC: 53 MG/DL
HYPERTENSION HTHYP: NO
LDLC SERPL CALC-MCNC: 90 MG/DL
SCREENING LOC CITY HTCIT: NORMAL
SCREENING LOC STATE HTSTA: NORMAL
SCREENING LOCATION HTLOC: NORMAL
SMOKING HTSMO: NO
SUBSCRIBER ID HTSID: NORMAL
TRIGL SERPL-MCNC: 49 MG/DL (ref 0–149)

## 2019-06-18 PROCEDURE — S5190 WELLNESS ASSESSMENT BY NONPH: HCPCS

## 2019-06-18 PROCEDURE — 82947 ASSAY GLUCOSE BLOOD QUANT: CPT

## 2019-06-18 PROCEDURE — 80061 LIPID PANEL: CPT

## 2019-09-24 ENCOUNTER — IMMUNIZATION (OUTPATIENT)
Dept: OCCUPATIONAL MEDICINE | Facility: CLINIC | Age: 34
End: 2019-09-24

## 2019-09-24 DIAGNOSIS — Z23 NEED FOR VACCINATION: ICD-10-CM

## 2019-09-24 PROCEDURE — 90686 IIV4 VACC NO PRSV 0.5 ML IM: CPT | Performed by: PREVENTIVE MEDICINE

## 2020-01-10 ENCOUNTER — OFFICE VISIT (OUTPATIENT)
Dept: MEDICAL GROUP | Facility: MEDICAL CENTER | Age: 35
End: 2020-01-10
Payer: COMMERCIAL

## 2020-01-10 VITALS
HEIGHT: 66 IN | BODY MASS INDEX: 25.4 KG/M2 | WEIGHT: 158.07 LBS | OXYGEN SATURATION: 97 % | RESPIRATION RATE: 16 BRPM | TEMPERATURE: 98.3 F | DIASTOLIC BLOOD PRESSURE: 68 MMHG | SYSTOLIC BLOOD PRESSURE: 110 MMHG | HEART RATE: 74 BPM

## 2020-01-10 DIAGNOSIS — R12 HEART BURN: ICD-10-CM

## 2020-01-10 DIAGNOSIS — R53.83 OTHER FATIGUE: ICD-10-CM

## 2020-01-10 DIAGNOSIS — Z00.00 WELL ADULT EXAM: ICD-10-CM

## 2020-01-10 PROCEDURE — 99214 OFFICE O/P EST MOD 30 MIN: CPT | Performed by: FAMILY MEDICINE

## 2020-01-10 RX ORDER — NORETHINDRONE ACETATE AND ETHINYL ESTRADIOL 1MG-20(21)
KIT ORAL
COMMUNITY
Start: 2019-12-22 | End: 2021-02-23

## 2020-01-10 RX ORDER — OMEPRAZOLE 20 MG/1
20 CAPSULE, DELAYED RELEASE ORAL DAILY
Qty: 30 CAP | Refills: 1 | Status: SHIPPED | OUTPATIENT
Start: 2020-01-10 | End: 2022-01-21

## 2020-01-10 ASSESSMENT — PATIENT HEALTH QUESTIONNAIRE - PHQ9: CLINICAL INTERPRETATION OF PHQ2 SCORE: 0

## 2020-01-10 NOTE — ASSESSMENT & PLAN NOTE
New problem. X 6 months. Having an epigastric pain that can get intense during the night. Gets burning sensation. Has a bad taste in her mouth by morning. Has used prilosec   She changed her diet which helped signficantly. But stopped.   Had home sleep study in the past.

## 2020-01-10 NOTE — ASSESSMENT & PLAN NOTE
"New problem. Has felt low energy and \"off\" since her divorce about 1 yr ago.   She reports a healthy diet  Trying to exercise on a regular basis.   She is nurse  at [employer].   She has been a little depressed. She did start zoloft at the beginning of her divorce process. SHe has since weaned herself off once her divorce was final.   Now with a steady boyfriend.   "

## 2020-01-10 NOTE — PROGRESS NOTES
"Subjective:     CC: Diagnoses of Other fatigue, Heart burn, and Well adult exam were pertinent to this visit.    HPI: Patient is a 34 y.o. female patient who presents today to establish care.      Other fatigue  New problem. Has felt low energy and \"off\" since her divorce about 1 yr ago.   She reports a healthy diet  Trying to exercise on a regular basis.   She is a nurse  at [employer].   She has been a little depressed. She did start zoloft at the beginning of her divorce process. She has since weaned herself off once her divorce was final.   Now with a steady boyfriend.   However, still feels that her mood may be affecting her energy levels.  She would also like labs done today.    Heart burn  New problem. X 6 months. Having an epigastric pain that can get intense during the night. Gets burning sensation as well. Has a bad taste in her mouth by morning. Has used prilosec with improvement.  She changed her diet which helped signficantly. But stopped.   Had home sleep study in the past which was normal.      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    • Seasonal allergies    • Shoulder tendonitis        Social History     Tobacco Use   • Smoking status: Never Smoker   • Smokeless tobacco: Never Used   Substance Use Topics   • Alcohol use: Yes     Alcohol/week: 6.0 oz     Types: 10 Standard drinks or equivalent per week     Comment: 2-10 per week   • Drug use: No       Current Outpatient Medications Ordered in Epic   Medication Sig Dispense Refill   • norethindrone-ethinyl estradiol (JUNEL FE 1/20) 1-20 MG-MCG per tablet      • omeprazole (PRILOSEC) 20 MG delayed-release capsule Take 1 Cap by mouth every day. 30 Cap 1   • Cetirizine HCl (ZYRTEC ALLERGY) 10 MG Cap Zyrtec 10 mg tablet   Take 1 tablet every day by oral route.     • Melatonin 5 MG Cap Take  by mouth at bedtime as needed.       No current " "Epic-ordered facility-administered medications on file.        Allergies:  Patient has no known allergies.    Health Maintenance: Completed    ROS:  Pulm: no sob, no cough  CV: no chest pain, no palpitations      Objective:       Exam:  /68 (BP Location: Right arm, Patient Position: Sitting, BP Cuff Size: Adult)   Pulse 74   Temp 36.8 °C (98.3 °F) (Temporal)   Resp 16   Ht 1.676 m (5' 6\")   Wt 71.7 kg (158 lb 1.1 oz)   SpO2 97%   BMI 25.51 kg/m²  Body mass index is 25.51 kg/m².      General: Normal appearing. No distress.  HEAD: NCAT  EYES: conjunctiva clear, lids without ptosis, pupils equal  and reactive to light  EARS: ears normal shape and contour, canals are clear bilaterally, TMs clear  MOUTH: oropharynx is without erythema, edema or exudates.   Neck: Supple without masses. Thyroid is not enlarged. Normal ROM  Pulmonary: Clear to ausculation.  Normal effort. No rales, ronchi, or wheezing.  Cardiovascular: Regular rate and rhythm, no murmur. No LE edema  Neurologic: Grossly normal, no focal deficits  Lymph: No cervical or supraclavicular lymph nodes are palpable  Skin: Warm and dry.  No obvious lesions.  Musculoskeletal: Normal gait and station.   Psych: Normal mood and affect. Alert and oriented x3. Judgment and insight is normal.      Assessment & Plan:     34 y.o. female with the following -     1. Other fatigue  New problem.  Ordered the following labs to rule out organic cause.  Plan to follow-up in 1 month once labs are completed.  - CBC WITH DIFFERENTIAL; Future  - TSH WITH REFLEX TO FT4; Future  - VITAMIN B12; Future  - VITAMIN D,25 HYDROXY; Future    2. Heart burn  New problem.  Plan to treat with PPI x2 weeks to improve any inflammation.  Discussed dietary changes.  Plan to follow-up in 1 month.  - Comp Metabolic Panel; Future  - omeprazole (PRILOSEC) 20 MG delayed-release capsule; Take 1 Cap by mouth every day.  Dispense: 30 Cap; Refill: 1    3. Well adult exam  - Lipid Profile; " Future      Return in about 4 weeks (around 2/7/2020).    Please note that this dictation was created using voice recognition software. I have made every reasonable attempt to correct obvious errors, but I expect that there are errors of grammar and possibly content that I did not discover before finalizing the note.

## 2020-01-17 ENCOUNTER — HOSPITAL ENCOUNTER (OUTPATIENT)
Dept: LAB | Facility: MEDICAL CENTER | Age: 35
End: 2020-01-17
Attending: FAMILY MEDICINE
Payer: COMMERCIAL

## 2020-01-17 DIAGNOSIS — R12 HEART BURN: ICD-10-CM

## 2020-01-17 DIAGNOSIS — Z00.00 WELL ADULT EXAM: ICD-10-CM

## 2020-01-17 DIAGNOSIS — R53.83 OTHER FATIGUE: ICD-10-CM

## 2020-01-17 LAB
25(OH)D3 SERPL-MCNC: 17 NG/ML (ref 30–100)
ALBUMIN SERPL BCP-MCNC: 4.1 G/DL (ref 3.2–4.9)
ALBUMIN/GLOB SERPL: 1.6 G/DL
ALP SERPL-CCNC: 53 U/L (ref 30–99)
ALT SERPL-CCNC: 20 U/L (ref 2–50)
ANION GAP SERPL CALC-SCNC: 9 MMOL/L (ref 0–11.9)
AST SERPL-CCNC: 29 U/L (ref 12–45)
BASOPHILS # BLD AUTO: 0.8 % (ref 0–1.8)
BASOPHILS # BLD: 0.03 K/UL (ref 0–0.12)
BILIRUB SERPL-MCNC: 1 MG/DL (ref 0.1–1.5)
BUN SERPL-MCNC: 11 MG/DL (ref 8–22)
CALCIUM SERPL-MCNC: 9.1 MG/DL (ref 8.5–10.5)
CHLORIDE SERPL-SCNC: 103 MMOL/L (ref 96–112)
CHOLEST SERPL-MCNC: 165 MG/DL (ref 100–199)
CO2 SERPL-SCNC: 26 MMOL/L (ref 20–33)
CREAT SERPL-MCNC: 0.67 MG/DL (ref 0.5–1.4)
EOSINOPHIL # BLD AUTO: 0.09 K/UL (ref 0–0.51)
EOSINOPHIL NFR BLD: 2.3 % (ref 0–6.9)
ERYTHROCYTE [DISTWIDTH] IN BLOOD BY AUTOMATED COUNT: 37 FL (ref 35.9–50)
FASTING STATUS PATIENT QL REPORTED: NORMAL
GLOBULIN SER CALC-MCNC: 2.5 G/DL (ref 1.9–3.5)
GLUCOSE SERPL-MCNC: 80 MG/DL (ref 65–99)
HCT VFR BLD AUTO: 39.8 % (ref 37–47)
HDLC SERPL-MCNC: 43 MG/DL
HGB BLD-MCNC: 14.2 G/DL (ref 12–16)
IMM GRANULOCYTES # BLD AUTO: 0 K/UL (ref 0–0.11)
IMM GRANULOCYTES NFR BLD AUTO: 0 % (ref 0–0.9)
LDLC SERPL CALC-MCNC: 112 MG/DL
LYMPHOCYTES # BLD AUTO: 1.69 K/UL (ref 1–4.8)
LYMPHOCYTES NFR BLD: 43.8 % (ref 22–41)
MCH RBC QN AUTO: 32.7 PG (ref 27–33)
MCHC RBC AUTO-ENTMCNC: 35.7 G/DL (ref 33.6–35)
MCV RBC AUTO: 91.7 FL (ref 81.4–97.8)
MONOCYTES # BLD AUTO: 0.46 K/UL (ref 0–0.85)
MONOCYTES NFR BLD AUTO: 11.9 % (ref 0–13.4)
NEUTROPHILS # BLD AUTO: 1.59 K/UL (ref 2–7.15)
NEUTROPHILS NFR BLD: 41.2 % (ref 44–72)
NRBC # BLD AUTO: 0 K/UL
NRBC BLD-RTO: 0 /100 WBC
PLATELET # BLD AUTO: 355 K/UL (ref 164–446)
PMV BLD AUTO: 9.6 FL (ref 9–12.9)
POTASSIUM SERPL-SCNC: 3.5 MMOL/L (ref 3.6–5.5)
PROT SERPL-MCNC: 6.6 G/DL (ref 6–8.2)
RBC # BLD AUTO: 4.34 M/UL (ref 4.2–5.4)
SODIUM SERPL-SCNC: 138 MMOL/L (ref 135–145)
TRIGL SERPL-MCNC: 51 MG/DL (ref 0–149)
TSH SERPL DL<=0.005 MIU/L-ACNC: 0.82 UIU/ML (ref 0.38–5.33)
VIT B12 SERPL-MCNC: 153 PG/ML (ref 211–911)
WBC # BLD AUTO: 3.9 K/UL (ref 4.8–10.8)

## 2020-01-17 PROCEDURE — 82607 VITAMIN B-12: CPT

## 2020-01-17 PROCEDURE — 82306 VITAMIN D 25 HYDROXY: CPT

## 2020-01-17 PROCEDURE — 36415 COLL VENOUS BLD VENIPUNCTURE: CPT

## 2020-01-17 PROCEDURE — 80061 LIPID PANEL: CPT

## 2020-01-17 PROCEDURE — 84443 ASSAY THYROID STIM HORMONE: CPT

## 2020-01-17 PROCEDURE — 80053 COMPREHEN METABOLIC PANEL: CPT

## 2020-01-17 PROCEDURE — 85025 COMPLETE CBC W/AUTO DIFF WBC: CPT

## 2020-02-07 ENCOUNTER — OFFICE VISIT (OUTPATIENT)
Dept: MEDICAL GROUP | Facility: MEDICAL CENTER | Age: 35
End: 2020-02-07
Payer: COMMERCIAL

## 2020-02-07 VITALS
DIASTOLIC BLOOD PRESSURE: 62 MMHG | OXYGEN SATURATION: 97 % | TEMPERATURE: 99.7 F | HEART RATE: 60 BPM | HEIGHT: 66 IN | RESPIRATION RATE: 16 BRPM | SYSTOLIC BLOOD PRESSURE: 100 MMHG | BODY MASS INDEX: 25.23 KG/M2 | WEIGHT: 156.97 LBS

## 2020-02-07 DIAGNOSIS — R53.83 OTHER FATIGUE: ICD-10-CM

## 2020-02-07 DIAGNOSIS — R79.89 LOW VITAMIN B12 LEVEL: ICD-10-CM

## 2020-02-07 DIAGNOSIS — D72.819 LEUKOPENIA, UNSPECIFIED TYPE: ICD-10-CM

## 2020-02-07 DIAGNOSIS — R12 HEART BURN: ICD-10-CM

## 2020-02-07 PROCEDURE — 96372 THER/PROPH/DIAG INJ SC/IM: CPT | Performed by: FAMILY MEDICINE

## 2020-02-07 PROCEDURE — 99214 OFFICE O/P EST MOD 30 MIN: CPT | Mod: 25 | Performed by: FAMILY MEDICINE

## 2020-02-07 RX ORDER — CYANOCOBALAMIN 1000 UG/ML
1000 INJECTION, SOLUTION INTRAMUSCULAR; SUBCUTANEOUS ONCE
Status: COMPLETED | OUTPATIENT
Start: 2020-02-07 | End: 2020-02-07

## 2020-02-07 RX ADMIN — CYANOCOBALAMIN 1000 MCG: 1000 INJECTION, SOLUTION INTRAMUSCULAR; SUBCUTANEOUS at 16:12

## 2020-02-08 NOTE — ASSESSMENT & PLAN NOTE
Chronic problem.  The patient was seen by gastroenterology.  She has an endoscopy is scheduled for March 5th. Advised to stay on prilosec for the next 8 weeks.

## 2020-02-08 NOTE — PROGRESS NOTES
Subjective:     CC: Diagnoses of Low vitamin B12 level, Heart burn, Leukopenia, unspecified type, and Other fatigue were pertinent to this visit.    HPI: Patient is a 34 y.o. female established patient who presents today to follow-up on labs and the following.      Heart burn  Chronic problem.  The patient was seen by gastroenterology.  She has an endoscopy is scheduled for March 5th. Advised to stay on prilosec for the next 8 weeks.    Other fatigue  Chronic problem.  Lab work reviewed today.  CBC mostly normal although white blood cell count slightly low.  B12 was low today.  Otherwise labs including CMP and TSH.    Low vitamin B12 level  New problem.  Etiology is unclear.  The patient follows a normal diet that includes meat and eggs.  However, she has been struggling with epigastric pain.  Getting endoscopy done in March.      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    • Seasonal allergies    • Shoulder tendonitis        Social History     Tobacco Use   • Smoking status: Never Smoker   • Smokeless tobacco: Never Used   Substance Use Topics   • Alcohol use: Yes     Alcohol/week: 6.0 oz     Types: 10 Standard drinks or equivalent per week     Comment: 2-10 per week   • Drug use: No       Current Outpatient Medications Ordered in Epic   Medication Sig Dispense Refill   • norethindrone-ethinyl estradiol (JUNEL FE 1/20) 1-20 MG-MCG per tablet      • omeprazole (PRILOSEC) 20 MG delayed-release capsule Take 1 Cap by mouth every day. 30 Cap 1   • Cetirizine HCl (ZYRTEC ALLERGY) 10 MG Cap Zyrtec 10 mg tablet   Take 1 tablet every day by oral route.     • Melatonin 5 MG Cap Take  by mouth at bedtime as needed.       No current Epic-ordered facility-administered medications on file.        Allergies:  Patient has no known allergies.    Health Maintenance: Completed    ROS:  Pulm: no sob, no cough  CV: no chest  "pain, no palpitations        Objective:       Exam:  /62 (BP Location: Left arm, Patient Position: Sitting, BP Cuff Size: Adult long)   Pulse 60   Temp 37.6 °C (99.7 °F) (Temporal)   Resp 16   Ht 1.676 m (5' 6\")   Wt 71.2 kg (156 lb 15.5 oz)   SpO2 97%   BMI 25.34 kg/m²  Body mass index is 25.34 kg/m².    General: Normal appearing. No distress.  HEAD: NCAT  EYES: conjunctiva clear, lids without ptosis, pupils equal and reactive to light  EARS: ears normal shape and contour.  MOUTH: normal dentition   Neck:  Normal ROM  Pulmonary: Normal effort. Normal respiratory rate.  Cardiovascular: Well perfused. No LE edema  Neurologic: Grossly normal, no focal deficits  Skin: Warm and dry.  No obvious lesions.  Musculoskeletal: Normal gait and station.   Psych: Normal mood and affect. Alert and oriented x3. Judgment and insight is normal.      Labs: 1/17/2020 results reviewed and discussed with the patient, questions answered.    Assessment & Plan:     34 y.o. female with the following -     1. Low vitamin B12 level  New problem.  Possibly causing her fatigue.  B12 injection given today.  Etiology is unclear, may be due to gastritis.  The patient is getting an endoscopy in March, also getting checked for H. pylori at that time.  If endoscopy is normal we should think about checking for intrinsic factor or parietal cell antibodies.  - cyanocobalamin (VITAMIN B-12) injection 1,000 mcg  - CBC WITH DIFFERENTIAL; Future  - VITAMIN B12; Future    2. Heart burn  Chronic problem, followed by GI now.  Getting endoscopy in March.  Advised to continue Prilosec for the next 8 weeks.    3. Leukopenia, unspecified type  New problem.  Plan to repeat CBC in 4 weeks.  - CBC WITH DIFFERENTIAL; Future    4. Other fatigue  Chronic problem.  B12 injection given today given that her B12 was low.  CMP was normal.  No signs of anemia.  TSH was also normal.  If no improvement in fatigue plan to follow-up.    Plan to follow-up once labs " and endoscopy is completed.    Please note that this dictation was created using voice recognition software. I have made every reasonable attempt to correct obvious errors, but I expect that there are errors of grammar and possibly content that I did not discover before finalizing the note.

## 2020-02-08 NOTE — ASSESSMENT & PLAN NOTE
Chronic problem.  Lab work reviewed today.  CBC mostly normal although white blood cell count slightly low.  B12 was low today.  Otherwise labs including CMP and TSH.

## 2020-02-08 NOTE — ASSESSMENT & PLAN NOTE
New problem.  Etiology is unclear.  The patient follows a normal diet that includes meat and eggs.  However, she has been struggling with epigastric pain.  Getting endoscopy done in March.

## 2020-03-03 ENCOUNTER — HOSPITAL ENCOUNTER (OUTPATIENT)
Dept: LAB | Facility: MEDICAL CENTER | Age: 35
End: 2020-03-03
Attending: FAMILY MEDICINE
Payer: COMMERCIAL

## 2020-03-03 DIAGNOSIS — R79.89 LOW VITAMIN B12 LEVEL: ICD-10-CM

## 2020-03-03 DIAGNOSIS — D72.819 LEUKOPENIA, UNSPECIFIED TYPE: ICD-10-CM

## 2020-03-03 LAB
BASOPHILS # BLD AUTO: 0.6 % (ref 0–1.8)
BASOPHILS # BLD: 0.04 K/UL (ref 0–0.12)
EOSINOPHIL # BLD AUTO: 0.11 K/UL (ref 0–0.51)
EOSINOPHIL NFR BLD: 1.6 % (ref 0–6.9)
ERYTHROCYTE [DISTWIDTH] IN BLOOD BY AUTOMATED COUNT: 39.2 FL (ref 35.9–50)
HCT VFR BLD AUTO: 40 % (ref 37–47)
HGB BLD-MCNC: 14 G/DL (ref 12–16)
IMM GRANULOCYTES # BLD AUTO: 0.02 K/UL (ref 0–0.11)
IMM GRANULOCYTES NFR BLD AUTO: 0.3 % (ref 0–0.9)
LYMPHOCYTES # BLD AUTO: 2.18 K/UL (ref 1–4.8)
LYMPHOCYTES NFR BLD: 31.1 % (ref 22–41)
MCH RBC QN AUTO: 32.8 PG (ref 27–33)
MCHC RBC AUTO-ENTMCNC: 35 G/DL (ref 33.6–35)
MCV RBC AUTO: 93.7 FL (ref 81.4–97.8)
MONOCYTES # BLD AUTO: 0.49 K/UL (ref 0–0.85)
MONOCYTES NFR BLD AUTO: 7 % (ref 0–13.4)
NEUTROPHILS # BLD AUTO: 4.17 K/UL (ref 2–7.15)
NEUTROPHILS NFR BLD: 59.4 % (ref 44–72)
NRBC # BLD AUTO: 0 K/UL
NRBC BLD-RTO: 0 /100 WBC
PLATELET # BLD AUTO: 379 K/UL (ref 164–446)
PMV BLD AUTO: 10.4 FL (ref 9–12.9)
RBC # BLD AUTO: 4.27 M/UL (ref 4.2–5.4)
WBC # BLD AUTO: 7 K/UL (ref 4.8–10.8)

## 2020-03-03 PROCEDURE — 36415 COLL VENOUS BLD VENIPUNCTURE: CPT

## 2020-03-03 PROCEDURE — 85025 COMPLETE CBC W/AUTO DIFF WBC: CPT

## 2020-03-03 PROCEDURE — 82607 VITAMIN B-12: CPT

## 2020-03-04 LAB — VIT B12 SERPL-MCNC: 179 PG/ML (ref 211–911)

## 2020-03-10 ENCOUNTER — TELEPHONE (OUTPATIENT)
Dept: MEDICAL GROUP | Facility: MEDICAL CENTER | Age: 35
End: 2020-03-10

## 2020-03-10 ENCOUNTER — NON-PROVIDER VISIT (OUTPATIENT)
Dept: MEDICAL GROUP | Facility: MEDICAL CENTER | Age: 35
End: 2020-03-10
Payer: COMMERCIAL

## 2020-03-10 ENCOUNTER — APPOINTMENT (OUTPATIENT)
Dept: MEDICAL GROUP | Facility: MEDICAL CENTER | Age: 35
End: 2020-03-10
Payer: COMMERCIAL

## 2020-03-10 DIAGNOSIS — R79.89 LOW VITAMIN B12 LEVEL: ICD-10-CM

## 2020-03-10 RX ORDER — CYANOCOBALAMIN 1000 UG/ML
1000 INJECTION, SOLUTION INTRAMUSCULAR; SUBCUTANEOUS ONCE
Status: COMPLETED | OUTPATIENT
Start: 2020-03-10 | End: 2020-03-10

## 2020-03-10 RX ADMIN — CYANOCOBALAMIN 1000 MCG: 1000 INJECTION, SOLUTION INTRAMUSCULAR; SUBCUTANEOUS at 16:22

## 2020-03-10 NOTE — TELEPHONE ENCOUNTER
1. Caller Name: Kendra Perez                     Call Back Number: [phone] (home) [phone] (work)    Patient is on the MA Schedule today for B12 vaccine/injection.    SPECIFIC Action To Be Taken: Orders pending, please sign.

## 2020-03-10 NOTE — NON-PROVIDER
Kendra Perez is a 34 y.o. female here for a non-provider visit for B12 injection.    Reason for injection: Lower Vitamin B12 level  Order in MAR?: Yes  Patient supplied?:No  Minimum interval has been met for this injection (per MAR order): Yes    Order and dose verified by: HW  Patient tolerated injection and no adverse effects were observed or reported: Yes    # of Administrations remaining in MAR: 0

## 2020-03-27 ENCOUNTER — EH NON-PROVIDER (OUTPATIENT)
Dept: OCCUPATIONAL MEDICINE | Facility: CLINIC | Age: 35
End: 2020-03-27

## 2020-03-27 DIAGNOSIS — Z01.89 RESPIRATORY CLEARANCE EXAMINATION, ENCOUNTER FOR: ICD-10-CM

## 2020-03-27 PROCEDURE — 94375 RESPIRATORY FLOW VOLUME LOOP: CPT | Performed by: NURSE PRACTITIONER

## 2020-09-25 ENCOUNTER — IMMUNIZATION (OUTPATIENT)
Dept: OCCUPATIONAL MEDICINE | Facility: CLINIC | Age: 35
End: 2020-09-25

## 2020-09-25 DIAGNOSIS — Z23 NEED FOR VACCINATION: ICD-10-CM

## 2020-09-25 PROCEDURE — 90686 IIV4 VACC NO PRSV 0.5 ML IM: CPT | Performed by: NURSE PRACTITIONER

## 2020-11-13 ENCOUNTER — EH NON-PROVIDER (OUTPATIENT)
Dept: OCCUPATIONAL MEDICINE | Facility: CLINIC | Age: 35
End: 2020-11-13

## 2020-11-13 ENCOUNTER — HOSPITAL ENCOUNTER (OUTPATIENT)
Facility: MEDICAL CENTER | Age: 35
End: 2020-11-13
Attending: PREVENTIVE MEDICINE
Payer: COMMERCIAL

## 2020-11-13 DIAGNOSIS — Z11.59 ENCOUNTER FOR SCREENING FOR OTHER VIRAL DISEASES: ICD-10-CM

## 2020-11-13 LAB
COVID ORDER STATUS COVID19: NORMAL
SARS-COV-2 RNA RESP QL NAA+PROBE: NOTDETECTED
SPECIMEN SOURCE: NORMAL

## 2020-11-13 PROCEDURE — U0003 INFECTIOUS AGENT DETECTION BY NUCLEIC ACID (DNA OR RNA); SEVERE ACUTE RESPIRATORY SYNDROME CORONAVIRUS 2 (SARS-COV-2) (CORONAVIRUS DISEASE [COVID-19]), AMPLIFIED PROBE TECHNIQUE, MAKING USE OF HIGH THROUGHPUT TECHNOLOGIES AS DESCRIBED BY CMS-2020-01-R: HCPCS | Mod: CS | Performed by: PREVENTIVE MEDICINE

## 2020-11-16 ENCOUNTER — TELEPHONE (OUTPATIENT)
Dept: OCCUPATIONAL MEDICINE | Facility: CLINIC | Age: 35
End: 2020-11-16

## 2020-11-16 NOTE — TELEPHONE ENCOUNTER
Notified pt. of NEGATIVE COVID-19 test. Advised pt. not to return to work and to call employee screening line at [phone] for further instructions.

## 2020-11-20 ENCOUNTER — HOSPITAL ENCOUNTER (OUTPATIENT)
Dept: RADIOLOGY | Facility: MEDICAL CENTER | Age: 35
End: 2020-11-20
Attending: NURSE PRACTITIONER
Payer: COMMERCIAL

## 2020-11-20 DIAGNOSIS — K21.9 GASTROESOPHAGEAL REFLUX DISEASE, UNSPECIFIED WHETHER ESOPHAGITIS PRESENT: ICD-10-CM

## 2020-11-20 DIAGNOSIS — K59.00 CONSTIPATION, UNSPECIFIED CONSTIPATION TYPE: ICD-10-CM

## 2020-11-20 PROCEDURE — 74018 RADEX ABDOMEN 1 VIEW: CPT

## 2020-12-20 DIAGNOSIS — Z23 NEED FOR VACCINATION: ICD-10-CM

## 2021-02-03 ENCOUNTER — PHARMACY VISIT (OUTPATIENT)
Dept: PHARMACY | Facility: MEDICAL CENTER | Age: 36
End: 2021-02-03
Payer: COMMERCIAL

## 2021-02-03 PROCEDURE — RXMED WILLOW AMBULATORY MEDICATION CHARGE: Performed by: SPECIALIST

## 2021-02-03 PROCEDURE — RXMED WILLOW AMBULATORY MEDICATION CHARGE: Performed by: NURSE PRACTITIONER

## 2021-02-03 RX ORDER — NORETHINDRONE ACETATE AND ETHINYL ESTRADIOL 1MG-20(21)
KIT ORAL
Qty: 84 TAB | Refills: 0 | Status: SHIPPED | OUTPATIENT
Start: 2021-02-03 | End: 2022-01-21

## 2021-02-04 ENCOUNTER — PHARMACY VISIT (OUTPATIENT)
Dept: PHARMACY | Facility: MEDICAL CENTER | Age: 36
End: 2021-02-04
Payer: COMMERCIAL

## 2021-02-10 ENCOUNTER — IMMUNIZATION (OUTPATIENT)
Dept: FAMILY PLANNING/WOMEN'S HEALTH CLINIC | Facility: IMMUNIZATION CENTER | Age: 36
End: 2021-02-10
Attending: FAMILY MEDICINE
Payer: COMMERCIAL

## 2021-02-10 DIAGNOSIS — Z23 NEED FOR VACCINATION: ICD-10-CM

## 2021-02-10 DIAGNOSIS — Z23 ENCOUNTER FOR VACCINATION: Primary | ICD-10-CM

## 2021-02-10 PROCEDURE — 91301 MODERNA SARS-COV-2 VACCINE: CPT

## 2021-02-10 PROCEDURE — 0011A MODERNA SARS-COV-2 VACCINE: CPT

## 2021-02-23 ENCOUNTER — OFFICE VISIT (OUTPATIENT)
Dept: MEDICAL GROUP | Facility: MEDICAL CENTER | Age: 36
End: 2021-02-23
Payer: COMMERCIAL

## 2021-02-23 ENCOUNTER — PHARMACY VISIT (OUTPATIENT)
Dept: PHARMACY | Facility: MEDICAL CENTER | Age: 36
End: 2021-02-23
Payer: COMMERCIAL

## 2021-02-23 VITALS
HEART RATE: 77 BPM | HEIGHT: 66 IN | OXYGEN SATURATION: 99 % | SYSTOLIC BLOOD PRESSURE: 112 MMHG | DIASTOLIC BLOOD PRESSURE: 64 MMHG | TEMPERATURE: 97.8 F | WEIGHT: 158 LBS | BODY MASS INDEX: 25.39 KG/M2

## 2021-02-23 DIAGNOSIS — Z30.09 FAMILY PLANNING: ICD-10-CM

## 2021-02-23 DIAGNOSIS — R79.89 LOW VITAMIN D LEVEL: ICD-10-CM

## 2021-02-23 DIAGNOSIS — M25.512 ACUTE PAIN OF LEFT SHOULDER: ICD-10-CM

## 2021-02-23 DIAGNOSIS — R53.83 OTHER FATIGUE: ICD-10-CM

## 2021-02-23 DIAGNOSIS — R12 HEARTBURN: ICD-10-CM

## 2021-02-23 DIAGNOSIS — K29.30 CHRONIC SUPERFICIAL GASTRITIS WITHOUT BLEEDING: ICD-10-CM

## 2021-02-23 DIAGNOSIS — R79.89 LOW VITAMIN B12 LEVEL: ICD-10-CM

## 2021-02-23 DIAGNOSIS — M54.2 NECK PAIN: ICD-10-CM

## 2021-02-23 PROCEDURE — 99214 OFFICE O/P EST MOD 30 MIN: CPT | Performed by: FAMILY MEDICINE

## 2021-02-23 PROCEDURE — RXMED WILLOW AMBULATORY MEDICATION CHARGE: Performed by: FAMILY MEDICINE

## 2021-02-23 RX ORDER — PREDNISONE 20 MG/1
40 TABLET ORAL DAILY
Qty: 10 TABLET | Refills: 0 | Status: SHIPPED | OUTPATIENT
Start: 2021-02-23 | End: 2022-01-21

## 2021-02-23 RX ORDER — CYCLOBENZAPRINE HCL 5 MG
5-10 TABLET ORAL 3 TIMES DAILY PRN
Qty: 15 TABLET | Refills: 0 | Status: SHIPPED | OUTPATIENT
Start: 2021-02-23 | End: 2021-11-05

## 2021-02-23 RX ORDER — CYANOCOBALAMIN 1000 UG/ML
1000 INJECTION, SOLUTION INTRAMUSCULAR; SUBCUTANEOUS ONCE
Status: COMPLETED | OUTPATIENT
Start: 2021-02-23 | End: 2021-02-23

## 2021-02-23 RX ADMIN — CYANOCOBALAMIN 1000 MCG: 1000 INJECTION, SOLUTION INTRAMUSCULAR; SUBCUTANEOUS at 09:14

## 2021-02-23 ASSESSMENT — FIBROSIS 4 INDEX: FIB4 SCORE: 0.62

## 2021-02-23 ASSESSMENT — PATIENT HEALTH QUESTIONNAIRE - PHQ9: CLINICAL INTERPRETATION OF PHQ2 SCORE: 0

## 2021-02-23 NOTE — ASSESSMENT & PLAN NOTE
Had iUD but had frequent spotting.   Now on OCPs for the past 1.5 yrs. Feels that it affects her mood and may be causing constipation.   Has appt with gyn.

## 2021-02-23 NOTE — ASSESSMENT & PLAN NOTE
Chronic problem. Currently on prilosec. Struggling with constipation. Now on linzess. Working ok.

## 2021-02-23 NOTE — ASSESSMENT & PLAN NOTE
New problem. Has been experiencing worsening pain in the L neck. Started with L shoulder pain (worker's comp). Going to a chiropractor. Difficulty sleeping. In the trapezius and under her collar bone, radiutes up the neck and down her shoulder/back.   Does sometimes have numbness/tingling in the L arm. Worst when driving.   Initially did imaging of the L shoulder and spine, MRI. Minimal posterior disc bulging at C4-5 and C5-6, without evidence for neural compression

## 2021-02-23 NOTE — PROGRESS NOTES
Subjective:     CC: Diagnoses of Heart burn, Family planning, Neck pain, Acute pain of left shoulder, Chronic superficial gastritis without bleeding, Other fatigue, Low vitamin B12 level, and Low vitamin D level were pertinent to this visit.    HPI: Patient is a 35 y.o. female established patient who presents today to follow-up.  Last seen about 1 year ago.      Heart burn  Chronic problem. Currently on prilosec. Struggling with constipation. Now on linzess. Working pretty well.  The patient is seeing gastroenterology.  Had endoscopy with biopsy in March of last year.  It did show gastritis, no other concerning findings.    Family planning  Currently on Junel FE.  Had IUD but had frequent spotting.   Now on OCPs for the past 1.5 yrs. Feels that it affects her mood and may be causing constipation.   Has appt with gyn to discuss other options.    Neck pain  Left shoulder pain  New problem. Has been experiencing worsening pain in the L neck/shoulder over the past couple months. Started with L shoulder pain (worker's comp) this seems to fully resolved, Worker's Comp. case was closed.  Had been going to a chiropractor.  However, worsening again.  Feels a bit different than it did before.  Difficulty sleeping. In the trapezius and under her collar bone, radiates up the neck and down her shoulder/back.   Does sometimes have numbness/tingling in the L arm. Worst when driving.   She has had imaging of the C-spine in 2019, MRI. Minimal posterior disc bulging at C4-5 and C5-6, without evidence for neural compression.      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    • Seasonal allergies    • Shoulder tendonitis        Social History     Tobacco Use   • Smoking status: Never Smoker   • Smokeless tobacco: Never Used   Substance Use Topics   • Alcohol use: Yes     Alcohol/week: 6.0 oz     Types: 10 Standard drinks or  "equivalent per week     Comment: 2-10 per week   • Drug use: No       Current Outpatient Medications Ordered in Epic   Medication Sig Dispense Refill   • predniSONE (DELTASONE) 20 MG Tab Take 2 Tablets by mouth every day. 10 tablet 0   • cyclobenzaprine (FLEXERIL) 5 mg tablet Take 1-2 Tablets by mouth 3 times a day as needed for Moderate Pain or Muscle Spasms. 15 tablet 0   • norethindrone-ethinyl estradiol (JUNEL FE 1/20) 1-20 MG-MCG per tablet Take 1 tablet by mouth once daily. 84 Tab 0   • linaCLOtide 72 MCG Cap Take one capsule by mouth once a day on an empty stomach 30 Cap 4   • omeprazole (PRILOSEC) 20 MG delayed-release capsule Take 1 Cap by mouth every day. 30 Cap 1   • Melatonin 5 MG Cap Take  by mouth at bedtime as needed.     • norethindrone-ethinyl estradiol (JUNEL FE 1/20) 1-20 MG-MCG per tablet      • Cetirizine HCl (ZYRTEC ALLERGY) 10 MG Cap Zyrtec 10 mg tablet   Take 1 tablet every day by oral route.       No current [facility]-ordered facility-administered medications on file.       Allergies:  Patient has no known allergies.    Health Maintenance: Completed    ROS:  Pulm: no sob, no cough  CV: no chest pain, no palpitations      Objective:       Exam:  /64 (BP Location: Left arm, Patient Position: Sitting, BP Cuff Size: Adult long)   Pulse 77   Temp 36.6 °C (97.8 °F) (Temporal)   Ht 1.676 m (5' 6\")   Wt 71.7 kg (158 lb)   LMP 02/09/2021   SpO2 99%   BMI 25.50 kg/m²  Body mass index is 25.5 kg/m².    General: Normal appearing. No distress.  HEAD: NCAT  EYES: conjunctiva clear, lids without ptosis, pupils equal and reactive to light  EARS: ears normal shape and contour.  MOUTH: normal dentition   Neck:  Normal ROM  Pulmonary: There are scattered bilaterally, no wheezes rales or rhonchi.  Normal effort. Normal respiratory rate.  Cardiovascular: Regular rate and rhythm, no murmurs rubs or gallops.  Well perfused. No LE edema  Neurologic: Grossly normal, no focal deficits  Skin: Warm and dry.  No " obvious lesions.  Musculoskeletal: Normal gait and station.   Psych: Normal mood and affect. Alert and oriented x3. Judgment and insight is normal.    Labs: 1/17/20 Results reviewed and discussed with the patient, questions answered.    Assessment & Plan:     35 y.o. female with the following -     1.  GERD  Chronic problem, followed by gastroenterology, has had endoscopy with biopsy showing gastritis about 1 year ago.  Continues on Prilosec.  - Comp Metabolic Panel; Future    2. Family planning  Currently on OCPs, has appointment with GYN to discuss further.  I did advise to stop taking the placebo pills to reduce iron intake as that may be worsening her constipation.    3. Neck pain  New problem.  The patient has had chronic left shoulder pain/neck pain free couple years.  Reviewed MRI in 2019.  This pain feels little different.  She does report some mild intermittent numbness and tingling in the left shoulder/arm.  Plan to try a course of steroids with the muscle relaxer at night.  Referral placed to PT.  - predniSONE (DELTASONE) 20 MG Tab; Take 2 Tablets by mouth every day.  Dispense: 10 tablet; Refill: 0  - cyclobenzaprine (FLEXERIL) 5 mg tablet; Take 1-2 Tablets by mouth 3 times a day as needed for Moderate Pain or Muscle Spasms.  Dispense: 15 tablet; Refill: 0  - REFERRAL TO PHYSICAL THERAPY    4. Acute pain of left shoulder  See above  - predniSONE (DELTASONE) 20 MG Tab; Take 2 Tablets by mouth every day.  Dispense: 10 tablet; Refill: 0  - cyclobenzaprine (FLEXERIL) 5 mg tablet; Take 1-2 Tablets by mouth 3 times a day as needed for Moderate Pain or Muscle Spasms.  Dispense: 15 tablet; Refill: 0  - REFERRAL TO PHYSICAL THERAPY    5. Chronic superficial gastritis without bleeding  Chronic problem, likely the cause of her B12 deficiency.  B12 shot given today.   - CBC WITH DIFFERENTIAL; Future  - Comp Metabolic Panel; Future    6. Other fatigue  - CBC WITH DIFFERENTIAL; Future  - Comp Metabolic Panel;  Future    7. Low vitamin B12 level  - VITAMIN B12; Future    8. Low vitamin D level  - VITAMIN D,25 HYDROXY; Future    Plan to follow-up if any concerning lab findings.  She will let me know how her neck/shoulder pain is going, if no resolution we will plan to follow-up in clinic and consider referral to Ortho.  Return if symptoms worsen or fail to improve.    Please note that this dictation was created using voice recognition software. I have made every reasonable attempt to correct obvious errors, but I expect that there are errors of grammar and possibly content that I did not discover before finalizing the note.

## 2021-03-05 PROCEDURE — RXMED WILLOW AMBULATORY MEDICATION CHARGE: Performed by: NURSE PRACTITIONER

## 2021-03-08 ENCOUNTER — PHARMACY VISIT (OUTPATIENT)
Dept: PHARMACY | Facility: MEDICAL CENTER | Age: 36
End: 2021-03-08
Payer: COMMERCIAL

## 2021-03-10 ENCOUNTER — IMMUNIZATION (OUTPATIENT)
Dept: FAMILY PLANNING/WOMEN'S HEALTH CLINIC | Facility: IMMUNIZATION CENTER | Age: 36
End: 2021-03-10
Attending: INTERNAL MEDICINE
Payer: COMMERCIAL

## 2021-03-10 DIAGNOSIS — R12 HEART BURN: ICD-10-CM

## 2021-03-10 DIAGNOSIS — Z23 ENCOUNTER FOR VACCINATION: Primary | ICD-10-CM

## 2021-03-10 PROCEDURE — 91301 MODERNA SARS-COV-2 VACCINE: CPT

## 2021-03-10 PROCEDURE — 0012A MODERNA SARS-COV-2 VACCINE: CPT

## 2021-03-10 PROCEDURE — RXMED WILLOW AMBULATORY MEDICATION CHARGE: Performed by: NURSE PRACTITIONER

## 2021-03-10 RX ORDER — OMEPRAZOLE 20 MG/1
20 CAPSULE, DELAYED RELEASE ORAL DAILY
Qty: 30 CAPSULE | Refills: 1 | Status: CANCELLED | OUTPATIENT
Start: 2021-03-09

## 2021-03-12 ENCOUNTER — PHARMACY VISIT (OUTPATIENT)
Dept: PHARMACY | Facility: MEDICAL CENTER | Age: 36
End: 2021-03-12
Payer: COMMERCIAL

## 2021-04-02 NOTE — OP THERAPY EVALUATION
Outpatient Physical Therapy  INITIAL EVALUATION    [facility] Physical Therapy [place] [address] [facility], [address]  [place] [address]  Phone:  [phone]    Date of Evaluation: 2021    Patient: Kendra Perez  YOB: 1985  MRN: [id number]     Referring Provider: Tatyana Hinojosa M.D.  [address] [facility]  [address]  [place] [address]   Referring Diagnosis Neck pain [M54.2];Acute pain of left shoulder [M25.512]     Time Calculation    Start time: 0800  Stop time: 0900 Time Calculation (min): 60 minutes         Chief Complaint: Neck Problem and Shoulder Problem    Visit Diagnoses     ICD-10-CM   1. Neck pain  M54.2   2. Acute pain of left shoulder  M25.512       No data found  Subjective:   History of Present Illness:     Date of onset:  2021    Mechanism of injury:  The patient is a 36 year old female who reports neck ans shoulder paint to the (L) side. 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 She has some PT in the past ans injections. She is now a ; pain started again but a differentt pain. Different area of her neck, has pain to the (L) shoulder up into the neck. She has tingling and numbness begin in the arm. Sitting at her desk creates pressure in her neck and shoulder. She gets relief by lying down when she gets home and resting her head down on a pillow. She has difficulty getting her shirt off. She has HA's often but is not sure if it is associated to the (L) shoulder and neck area. She has increased pain lying on her (L) side at night. Patient was given a dose of steroids ans muscle relaxers. She has to change positions often throughout the day   Quality of life:  Good  Headaches:  postural headache and tension headaches  Ear problems: none  Sleep disturbance:  Interrupted sleep  # Times/Night awakened:  2  Pain:     Current pain rating:  3    At best pain ratin    At worst pain ratin    Quality:  Numbness, aching,  shooting and tightness    Pain timing:  In the evening and in the afternoon    Relieving factors:  Change in position, ice, pain medication and stretching    Aggravating factors:  Lifting, prolonged sitting and prolonged standing    Pain Comments::  Driving in the position to go home makes her pain worse  Hand dominance:  Right  Treatments:     Previous treatment:  Physical therapy, chiropractic, medication and injection treatment    Current treatment:  Rest  Patient Goals:     Other patient goals:  To alleviate her pain so she can work out again; decrease pain and increase mobility       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    • Seasonal allergies    • Shoulder tendonitis      Past Surgical History:   Procedure Laterality Date   • SHOULDER DECOMPRESSION ARTHROSCOPIC Right 1/17/2018    Procedure: SHOULDER DECOMPRESSION ARTHROSCOPIC - SUBACROMIAL;  Surgeon: Khadar Pate M.D.;  Location: [facility];  Service: Orthopedics   • SHOULDER ARTHROSCOPY W/ ROTATOR CUFF REPAIR  1/17/2018    Procedure: SHOULDER ARTHROSCOPY W/ ROTATOR CUFF REPAIR;  Surgeon: Khadar Pate M.D.;  Location: [facility];  Service: Orthopedics   • SHOULDER ARTHROSCOPY W/ BICIPITAL TENODESIS REPAIR  1/17/2018    Procedure: SHOULDER ARTHROSCOPY W/ BICIPITAL TENODESIS REPAIR;  Surgeon: Khadar Pate M.D.;  Location: [facility];  Service: Orthopedics   • GANGLION EXCISION Left 2005    From wrist   • LUMPECTOMY Left 2004    benign fibroadenoma to breast   • ADENOIDECTOMY  1990   • DENTAL EXTRACTION(S)  1990's    [place] teeth     Social History     Tobacco Use   • Smoking status: Never Smoker   • Smokeless tobacco: Never Used   Substance Use Topics   • Alcohol use: Yes     Alcohol/week: 6.0 oz     Types: 10 Standard drinks or equivalent per week     Comment: 2-10 per week      Family and Occupational History     Socioeconomic History   • Marital status:      Spouse name: Not on file   • Number of children: Not on file   • Years of education: Not on file   • Highest education level: Not on file   Occupational History   • Not on file       Objective     Postural Observations  Seated posture: fair  Standing posture: fair  Correction of posture: has no consistent effect    Additional Postural Observation Details  Slight forward head position and rounded shoulders    Active Range of Motion     Cervical Spine   Flexion: decreased (40)  Extension: decreased (52)  Left lateral flexion: decreased (30)  Right lateral flexion: decreased (30)  Left rotation: within functional limits (60)  Right rotation: decreased (45)  Left Shoulder   Flexion: 165 degrees   Extension: 45 degrees   Abduction: 160 degrees   External rotation BTH: C6   Internal rotation BTB: T5     Right Shoulder   Flexion: 175 degrees   Extension: 50 degrees   Abduction: 170 degrees   External rotation BTH: C7   Internal rotation BTB: T4     Scapular Mobility   Left Shoulder   Scapular Mobility with Shoulder to 90° FF   Scapular elevation: minimal    Strength:    Cervical Spine   Deep neck flexors: 4+  Deep neck extensors: 4-    Left Shoulder   Planes of Motion   Flexion: 4+   Extension: 5   Abduction: 4+   External rotation BTH: 4+   Internal rotation BTB: 5     Right Shoulder   Planes of Motion   Flexion: 5   Extension: 5   Abduction: 5   External rotation BTH: 5   Internal rotation BTB: 5     Tests     Left Shoulder   Positive Hawkin's.   Negative crossover, empty can, horn blower and Spurling's sign.     Right Shoulder   Negative Spurling's sign.         Therapeutic Exercises (CPT 31306):     1. Upper trap stretch    2. Ulnar/median nerve flossing; OK sign, 3 x30 sec    3. Prone I's, 1 x10 reps    4. Foam roller     5. Thoracic extension    6. Prone shoulder flexion lifts       Time-based  treatments/modalities:    Physical Therapy Timed Treatment Charges  Manual therapy minutes (CPT 17727): 10 minutes  Therapeutic exercise minutes (CPT 24862): 10 minutes      Assessment, Response and Plan:   Impairments: impaired functional mobility, impaired physical strength, lacks appropriate home exercise program and pain with function    Assessment details:  The patient is a 36 year old female who reports neck and (L) shoulder pain with lifting and overhead activity especially during the later part of the day after work. Patient has difficulty reading as much as she would like due to increased pain. She has difficulty sleeping at night and awakes and cannot lie on her (L) side. The patient would benefit from skilled physical therapy to address chronic neck and shoulder pain working with soft tissue mobilization and manual therapy techniques, strength and conditioning, AROM/PROM, functional training and activity tolerance   Barriers to therapy:  None  Prognosis: good    Goals:   Short Term Goals:   1) Indep with HEP  2) 50% improvement in sleeping at night with less interruption by 1-2 times and less pain to neck and (L) shoulder   3) 25%-50% less headaches during the week  4) Increase (L) sh IR from 4+/5 to 5/5 at BTB positioning  Short term goal time span:  2-4 weeks      Long Term Goals:    1) Progression/regressionadvancing HEP   2) Indep with getting up and moving into correct postural positioning often throughout day  3) Patient able to concentrate and read 50-75% more often wit less pain   4) Increase (L) shoulder AROM to BTB to T4  5) Improved strength in (L) shoulder flexion by 1 MM grade     Long term goal time span:  4-6 weeks    Plan:   Therapy options:  Physical therapy treatment to continue  Planned therapy interventions:  E Stim Unattended (CPT 09715), Functional Training, Self Care (CPT 03246), Manual Therapy (CPT 45790), Neuromuscular Re-education (CPT 94637), Self Care ADL Training (CPT 27446),  Therapeutic Activities (CPT 15925) and Therapeutic Exercise (CPT 57976)  Frequency:  2x week  Duration in weeks:  6  Duration in visits:  12  Discussed with:  Patient      Functional Assessment Used        Referring provider co-signature:  I have reviewed this plan of care and my co-signature certifies the need for services.    Certification Period: 04/05/2021 to  05/17/21    Physician Signature: ________________________________ Date: ______________

## 2021-04-05 ENCOUNTER — PHYSICAL THERAPY (OUTPATIENT)
Dept: PHYSICAL THERAPY | Facility: REHABILITATION | Age: 36
End: 2021-04-05
Attending: FAMILY MEDICINE
Payer: COMMERCIAL

## 2021-04-05 DIAGNOSIS — M54.2 NECK PAIN: ICD-10-CM

## 2021-04-05 DIAGNOSIS — M25.512 ACUTE PAIN OF LEFT SHOULDER: ICD-10-CM

## 2021-04-05 PROCEDURE — 97110 THERAPEUTIC EXERCISES: CPT

## 2021-04-05 PROCEDURE — 97162 PT EVAL MOD COMPLEX 30 MIN: CPT

## 2021-04-05 SDOH — ECONOMIC STABILITY: GENERAL: QUALITY OF LIFE: GOOD

## 2021-04-05 ASSESSMENT — ENCOUNTER SYMPTOMS
ALLEVIATING FACTORS: CHANGE IN POSITION
EXACERBATED BY: PROLONGED STANDING
ALLEVIATING FACTORS: ICE
POSTURAL HEADACHE: 1
ALLEVIATING FACTORS: PAIN MEDICATION
QUALITY: SHOOTING
PAIN SCALE AT LOWEST: 0
QUALITY: TIGHTNESS
PAIN TIMING: IN THE EVENING
EXACERBATED BY: LIFTING
PAIN SCALE: 3
QUALITY: ACHING
PAIN SCALE AT HIGHEST: 7
QUALITY: NUMBNESS
PAIN TIMING: IN THE AFTERNOON
EXACERBATED BY: PROLONGED SITTING
AWAKENINGS PER NIGHT: 2
ALLEVIATING FACTORS: STRETCHING

## 2021-04-12 ENCOUNTER — PHYSICAL THERAPY (OUTPATIENT)
Dept: PHYSICAL THERAPY | Facility: REHABILITATION | Age: 36
End: 2021-04-12
Attending: FAMILY MEDICINE
Payer: COMMERCIAL

## 2021-04-12 DIAGNOSIS — M54.2 NECK PAIN: ICD-10-CM

## 2021-04-12 DIAGNOSIS — M25.512 ACUTE PAIN OF LEFT SHOULDER: ICD-10-CM

## 2021-04-12 PROCEDURE — RXMED WILLOW AMBULATORY MEDICATION CHARGE: Performed by: NURSE PRACTITIONER

## 2021-04-12 PROCEDURE — 97110 THERAPEUTIC EXERCISES: CPT

## 2021-04-12 PROCEDURE — 97140 MANUAL THERAPY 1/> REGIONS: CPT

## 2021-04-12 NOTE — OP THERAPY DAILY TREATMENT
Outpatient Physical Therapy  DAILY TREATMENT     [facility] Outpatient Physical Therapy [address] [facility], [address]  [place] [address]  Phone:  [phone]    Date: 04/12/2021    Patient: Kendra Perez  YOB: 1985  MRN: [id number]     Time Calculation    Start time: 0930  Stop time: 1000 Time Calculation (min): 30 minutes         Chief Complaint: Shoulder Problem    Visit #: 2    SUBJECTIVE:  The patient reports having more neck and (L) shoulder pain over the weekend.    OBJECTIVE:  Current objective measures:       Decrease pain to the (L) upper trap and increase AROM to the (L) shoulder     Therapeutic Exercises (CPT 88586):     1. Upper trap stretch, 3 x30 sec    2. Ulnar/median nerve flossing; OK sign, 3 x30 sec    3. Prone I's, 1 x10 reps    4. Foam roller , 3'     5. Thoracic extension    6. Prone shoulder flexion lifts , 10x each    7. Prone head lifts, 10x    Therapeutic Treatments and Modalities:     1. Manual Therapy (CPT 23063), (L) upper trap, STM to the (L) upper trap; (L) sh flexion; abduction, ER PROM; PAMS (L) GH grade 2-3 AP's      Time-based treatments/modalities:    Physical Therapy Timed Treatment Charges  Manual therapy minutes (CPT 23704): 15 minutes  Therapeutic exercise minutes (CPT 03558): 15 minutes      ASSESSMENT:   Response to treatment:   STM to the (L) scapular region and mid traps; patient had increased hypertonicity to the traps along ridge of scapula; patient performed (L) UE arm lifts with difficulty in flexion due to pain.    PLAN/RECOMMENDATIONS:   Plan for treatment: therapy treatment to continue next visit.  Planned interventions for next visit: continue with current treatment.

## 2021-04-14 ENCOUNTER — PHARMACY VISIT (OUTPATIENT)
Dept: PHARMACY | Facility: MEDICAL CENTER | Age: 36
End: 2021-04-14
Payer: COMMERCIAL

## 2021-04-15 ENCOUNTER — APPOINTMENT (OUTPATIENT)
Dept: PHYSICAL THERAPY | Facility: REHABILITATION | Age: 36
End: 2021-04-15
Attending: FAMILY MEDICINE
Payer: COMMERCIAL

## 2021-04-20 ENCOUNTER — PHYSICAL THERAPY (OUTPATIENT)
Dept: PHYSICAL THERAPY | Facility: REHABILITATION | Age: 36
End: 2021-04-20
Attending: FAMILY MEDICINE
Payer: COMMERCIAL

## 2021-04-20 DIAGNOSIS — M54.2 NECK PAIN: ICD-10-CM

## 2021-04-20 DIAGNOSIS — M25.512 ACUTE PAIN OF LEFT SHOULDER: ICD-10-CM

## 2021-04-20 PROCEDURE — 97110 THERAPEUTIC EXERCISES: CPT

## 2021-04-20 PROCEDURE — 97140 MANUAL THERAPY 1/> REGIONS: CPT

## 2021-04-20 NOTE — OP THERAPY DAILY TREATMENT
Outpatient Physical Therapy  DAILY TREATMENT     [facility] Outpatient Physical Therapy [place] [address] [facility], [address]  [place] [address]  Phone:  [phone]    Date: 04/20/2021    Patient: Kendra Perez  YOB: 1985  MRN: [id number]     Time Calculation    Start time: 0330  Stop time: 0400 Time Calculation (min): 30 minutes         Chief Complaint: Shoulder Problem    Visit #: 3    SUBJECTIVE:  The patient reports having some increased discomfort to the (L) side of the neck ans shoulder region    OBJECTIVE:  Current objective measures:       Decreased connective tissue tightness to the sub-occipital/occipitalis ; Increase AROM in cervical rotation 5-10 deg    Therapeutic Exercises (CPT 98358):     1. Upper trap stretch, 3 x30 sec    2. Ulnar/median nerve flossing; OK sign, 3 x30 sec    3. Prone I's& T's, 1 x10 reps each     4. Foam roller , 3'     5. Thoracic extension, 15 reps    6. Prone shoulder flexion lifts , 10x each    7. Prone head lifts, 10x    8. Wall lift offs, 1 x10 reps (orange tb)    9. Faoam roller snow angels     Therapeutic Treatments and Modalities:     1. Manual Therapy (CPT 67419), (L) upper trap, STM to the (L) upper trap; (L) sh flexion; abduction, ER PROM; PAMS (L) GH grade 2-3 AP's  , C-spine, Manual traction  STM to sub-occipitalsi/ occipitals    Time-based treatments/modalities:    Physical Therapy Timed Treatment Charges  Manual therapy minutes (CPT 81460): 15 minutes  Therapeutic exercise minutes (CPT 33589): 15 minutes      ASSESSMENT:   Response to treatment:   STM to the upper traps andoccipitalis sub-occipital; patient had increased connective tissue tension relief from manual traction; patient  reported more tension to the (L) side of the posterior shoulder and upper traps; performed wall lift off's with increased fatigue and tension to (L) shoulder following treatment. Next visit: foam roller exercises with free weights    PLAN/RECOMMENDATIONS:   Plan for treatment:  therapy treatment to continue next visit.  Planned interventions for next visit: continue with current treatment.

## 2021-04-27 ENCOUNTER — APPOINTMENT (OUTPATIENT)
Dept: PHYSICAL THERAPY | Facility: REHABILITATION | Age: 36
End: 2021-04-27
Attending: FAMILY MEDICINE
Payer: COMMERCIAL

## 2021-05-11 PROCEDURE — RXMED WILLOW AMBULATORY MEDICATION CHARGE: Performed by: NURSE PRACTITIONER

## 2021-05-13 ENCOUNTER — PHARMACY VISIT (OUTPATIENT)
Dept: PHARMACY | Facility: MEDICAL CENTER | Age: 36
End: 2021-05-13
Payer: COMMERCIAL

## 2021-06-15 PROCEDURE — RXMED WILLOW AMBULATORY MEDICATION CHARGE: Performed by: NURSE PRACTITIONER

## 2021-06-22 ENCOUNTER — PHARMACY VISIT (OUTPATIENT)
Dept: PHARMACY | Facility: MEDICAL CENTER | Age: 36
End: 2021-06-22
Payer: COMMERCIAL

## 2021-07-08 PROCEDURE — RXMED WILLOW AMBULATORY MEDICATION CHARGE: Performed by: NURSE PRACTITIONER

## 2021-07-12 ENCOUNTER — PHARMACY VISIT (OUTPATIENT)
Dept: PHARMACY | Facility: MEDICAL CENTER | Age: 36
End: 2021-07-12
Payer: COMMERCIAL

## 2021-09-17 ENCOUNTER — OFFICE VISIT (OUTPATIENT)
Dept: DERMATOLOGY | Facility: IMAGING CENTER | Age: 36
End: 2021-09-17
Payer: COMMERCIAL

## 2021-09-17 DIAGNOSIS — D18.01 CHERRY ANGIOMA: ICD-10-CM

## 2021-09-17 DIAGNOSIS — Z12.83 SKIN CANCER SCREENING: ICD-10-CM

## 2021-09-17 DIAGNOSIS — L81.4 LENTIGINES: ICD-10-CM

## 2021-09-17 DIAGNOSIS — L72.0 MILIA: ICD-10-CM

## 2021-09-17 DIAGNOSIS — L82.1 SEBORRHEIC KERATOSES: ICD-10-CM

## 2021-09-17 DIAGNOSIS — D22.9 MULTIPLE NEVI: ICD-10-CM

## 2021-09-17 PROCEDURE — 99203 OFFICE O/P NEW LOW 30 MIN: CPT | Performed by: NURSE PRACTITIONER

## 2021-09-17 NOTE — PROGRESS NOTES
.DERMATOLOGY NOTE  NEW VISIT       Chief complaint: establish   Last exam 10 years ago   MELO            History of skin cancer: No  History of precancers/actinic keratoses: No  History of biopsies:Yes, Details: left forearm , resulted AK   History of blistering/severe sunburns:No  Family history of skin cancer:No  Family history of atypical moles:No      No Known Allergies     MEDICATIONS:  Medications relevant to specialty reviewed.     REVIEW OF SYSTEMS:   Positive for skin (see HPI)  Negative for fevers and chills       EXAM:  There were no vitals taken for this visit.  Constitutional: Well-developed, well-nourished, and in no distress.     A total body skin exam was performed excluding the genitals per patient preference and including the following areas: head (including face), neck, chest, abdomen, groin/buttocks, back, bilateral upper extremities, and bilateral lower extremities with the following pertinent findings listed below and/or in assessment/plan.     Multiple medium brown dark brown macules scattered over the trunk >> extremities. All with benign-appearing pigment network patterns on dermoscopy    1-2mm hypopigmented/white cystic papules to face     Several tan stuck-on waxy papules scattered on the trunk    keloid scars noted to right anterior shoulder, and posterior shoulder x3    -face with scattered clinically benign light brown reticulated macules all of which were morphologically similar and none of which were suspicious for skin cancer today on exam    Several scattered 1-3mm bright red macules and thin papules on the trunk and scalp    IMPRESSION / PLAN:    1. Multiple nevi  - Benign-appearing nature of lesions discussed. Advised to return to clinic for any new or concerning changes.  - ABCDE's of melanoma discussed      2. Lentigines  - Discussed benign nature of lesions, related to sun exposure  - Discussed sun protection, hand out provided      3. Seborrheic keratoses  - Benign-appearing  nature of lesions discussed. Advised to return to clinic for any new or concerning changes.  -advised she can schedule in future for cosmetic cryo    4. Cherry angioma  - Benign-appearing nature of lesions discussed. Advised to return to clinic for any new or concerning changes.      5. Milia  - Benign-appearing nature of lesions discussed. Advised to return to clinic for any new or concerning changes.  -advised she can see  for extractions       6. Skin cancer screening  Skin cancer education  - discussed importance of sun protective clothing, eyewear  - discussed importance of daily use of broad spectrum sunscreen with SPF 30 or greater, as well as need for reapplication ~every 2 hours when exposed to UVR  - discussed importance of regular self-exams, ideally once per month, every 12 months exams in clinic  - ABCDE's of melanoma discussed  - patient to bring any new or concerning lesions to my attention  - Patient educational handout provided and reviewed with patient    PA started for ILK for keloid scars       Please note that this dictation was created using voice recognition software. I have made every reasonable attempt to correct obvious errors, but I expect that there are errors of grammar and possibly content that I did not discover before finalizing the note.      Return to clinic in: Return in about 1 year (around 9/17/2022) for MELO. and as needed for any new or changing skin lesions.

## 2021-09-23 ENCOUNTER — IMMUNIZATION (OUTPATIENT)
Dept: OCCUPATIONAL MEDICINE | Facility: CLINIC | Age: 36
End: 2021-09-23
Payer: COMMERCIAL

## 2021-09-23 DIAGNOSIS — Z23 NEED FOR VACCINATION: Primary | ICD-10-CM

## 2021-09-23 PROCEDURE — 90686 IIV4 VACC NO PRSV 0.5 ML IM: CPT | Performed by: NURSE PRACTITIONER

## 2021-10-15 ENCOUNTER — TELEPHONE (OUTPATIENT)
Dept: DERMATOLOGY | Facility: IMAGING CENTER | Age: 36
End: 2021-10-15

## 2021-10-15 NOTE — TELEPHONE ENCOUNTER
Called pt x2 and x2 vmails to schedule appointment with Kandi Rosales for ILK for Keloid Scars. No answer, no call backs.

## 2021-11-05 ENCOUNTER — OFFICE VISIT (OUTPATIENT)
Dept: DERMATOLOGY | Facility: IMAGING CENTER | Age: 36
End: 2021-11-05
Payer: COMMERCIAL

## 2021-11-05 DIAGNOSIS — L91.0 KELOID SCAR: ICD-10-CM

## 2021-11-05 PROCEDURE — 11900 INJECT SKIN LESIONS </W 7: CPT | Performed by: NURSE PRACTITIONER

## 2021-11-05 RX ORDER — CALCIUM CARBONATE 300MG(750)
TABLET,CHEWABLE ORAL
COMMUNITY
End: 2024-01-24

## 2021-11-05 RX ORDER — TRIAMCINOLONE ACETONIDE 40 MG/ML
20 INJECTION, SUSPENSION INTRA-ARTICULAR; INTRAMUSCULAR ONCE
Status: COMPLETED | OUTPATIENT
Start: 2021-11-05 | End: 2021-11-05

## 2021-11-05 RX ORDER — TRIAMCINOLONE ACETONIDE 40 MG/ML
40 INJECTION, SUSPENSION INTRA-ARTICULAR; INTRAMUSCULAR ONCE
Status: COMPLETED | OUTPATIENT
Start: 2021-11-05 | End: 2021-11-05

## 2021-11-05 RX ORDER — LEVONORGESTREL 19.5 MG/1
1 INTRAUTERINE DEVICE INTRAUTERINE ONCE
COMMUNITY

## 2021-11-05 RX ORDER — FAMOTIDINE 20 MG/1
20 TABLET, FILM COATED ORAL 2 TIMES DAILY
COMMUNITY
End: 2022-01-21

## 2021-11-05 RX ADMIN — TRIAMCINOLONE ACETONIDE 40 MG: 40 INJECTION, SUSPENSION INTRA-ARTICULAR; INTRAMUSCULAR at 14:36

## 2021-11-05 RX ADMIN — TRIAMCINOLONE ACETONIDE 20 MG: 40 INJECTION, SUSPENSION INTRA-ARTICULAR; INTRAMUSCULAR at 14:36

## 2021-11-05 NOTE — PROGRESS NOTES
DERMATOLOGY NOTE  FOLLOW UP VISIT      Chief complaint: Follow-Up (ILK Keloid scar)     HPI/location: right shoulder, keloid scars-post shoulder surgery-itching and burning   Time present: 2018  Painful lesion: Yes  Itching lesion: Yes  Enlarging lesion: Yes  Anything make it better or worse? No      History of skin cancer: No  History of precancers/actinic keratoses: No  History of biopsies:Yes, Details: left forearm , resulted AK   History of blistering/severe sunburns:No  Family history of skin cancer:No  Family history of atypical moles:No        No Known Allergies     MEDICATIONS:  Medications relevant to specialty reviewed.     REVIEW OF SYSTEMS:   Positive for skin (see HPI)  Negative for fevers and chills       EXAM:  There were no vitals taken for this visit.  Constitutional: Well-developed, well-nourished, and in no distress.     A focused skin exam was performed including the affected areas of the right shoulder. Notable findings on exam today listed below and/or in assessment/plan.     keloid scars noted to right anterior shoulder, and posterior shoulder x3  IMPRESSION / PLAN:    1. Keloid scarx4  INTRALESIONAL KENALOG:  Discussed risks and benefits of intralesional kenalog injections, including skin atrophy, discoloration, minimal risk of infection. Patient verbally agreed. ILK of 10mg to anterior shoulder 20mg to posterior shoulder and 40mg to anterior shoulderx2 into three scars  on the right shoulder. Patient tolerated procedure well, no complications. Aftercare discussed.          Please note that this dictation was created using voice recognition software. I have made every reasonable attempt to correct obvious errors, but I expect that there are errors of grammar and possibly content that I did not discover before finalizing the note.      Return to clinic in: Return for 6-8 weeks. and as needed for any new or changing skin lesions.

## 2021-12-08 ENCOUNTER — APPOINTMENT (OUTPATIENT)
Dept: DERMATOLOGY | Facility: IMAGING CENTER | Age: 36
End: 2021-12-08

## 2021-12-20 ENCOUNTER — OFFICE VISIT (OUTPATIENT)
Dept: DERMATOLOGY | Facility: IMAGING CENTER | Age: 36
End: 2021-12-20
Payer: COMMERCIAL

## 2021-12-20 DIAGNOSIS — L91.0 KELOID SCAR: ICD-10-CM

## 2021-12-20 PROCEDURE — 11900 INJECT SKIN LESIONS </W 7: CPT | Performed by: NURSE PRACTITIONER

## 2021-12-20 RX ORDER — TRIAMCINOLONE ACETONIDE 40 MG/ML
40 INJECTION, SUSPENSION INTRA-ARTICULAR; INTRAMUSCULAR ONCE
Status: COMPLETED | OUTPATIENT
Start: 2021-12-20 | End: 2021-12-20

## 2021-12-20 RX ADMIN — TRIAMCINOLONE ACETONIDE 40 MG: 40 INJECTION, SUSPENSION INTRA-ARTICULAR; INTRAMUSCULAR at 10:59

## 2021-12-20 NOTE — PROGRESS NOTES
DERMATOLOGY NOTE  FOLLOW UP VISIT      Chief complaint: Follow-Up     HPI/location: right shoulder, keloid scars-post shoulder surgery-itching and burning   Time present: 2018  Painful lesion: Yes  Itching lesion: Yes  Enlarging lesion: Yes  Anything make it better or worse? No      History of skin cancer: No  History of precancers/actinic keratoses: No  History of biopsies:Yes, Details: left forearm , resulted AK   History of blistering/severe sunburns:No  Family history of skin cancer:No  Family history of atypical moles:No        No Known Allergies     MEDICATIONS:  Medications relevant to specialty reviewed.     REVIEW OF SYSTEMS:   Positive for skin (see HPI)  Negative for fevers and chills       EXAM:  There were no vitals taken for this visit.  Constitutional: Well-developed, well-nourished, and in no distress.     A focused skin exam was performed including the affected areas of the right shoulder. Notable findings on exam today listed below and/or in assessment/plan.     keloid scars noted to right anterior shoulder, and posterior shoulder x3  IMPRESSION / PLAN:    1. Keloid scarx4  INTRALESIONAL KENALOG:  Discussed risks and benefits of intralesional kenalog injections, including skin atrophy, discoloration, minimal risk of infection. Patient verbally agreed. ILK of 20mg to anterior shoulder 40mg to posterior shoulder and 40mg to anterior shoulderx2 into three scars  on the right shoulder. Patient tolerated procedure well, no complications. Aftercare discussed.     I have performed a physical exam and reviewed and updated ROS and Plan today (12/20/2021). In review of dermatology visit (11/5/2021), there are no changes except as documented above.          Please note that this dictation was created using voice recognition software. I have made every reasonable attempt to correct obvious errors, but I expect that there are errors of grammar and possibly content that I did not discover before finalizing the  note.      Return to clinic in: Return in about 6 weeks (around 1/31/2022). and as needed for any new or changing skin lesions.

## 2022-01-07 ENCOUNTER — HOSPITAL ENCOUNTER (OUTPATIENT)
Dept: LAB | Facility: MEDICAL CENTER | Age: 37
End: 2022-01-07
Attending: FAMILY MEDICINE
Payer: COMMERCIAL

## 2022-01-07 DIAGNOSIS — R79.89 LOW VITAMIN D LEVEL: ICD-10-CM

## 2022-01-07 DIAGNOSIS — R79.89 LOW VITAMIN B12 LEVEL: ICD-10-CM

## 2022-01-07 DIAGNOSIS — R12 HEARTBURN: ICD-10-CM

## 2022-01-07 DIAGNOSIS — R53.83 OTHER FATIGUE: ICD-10-CM

## 2022-01-07 DIAGNOSIS — K29.30 CHRONIC SUPERFICIAL GASTRITIS WITHOUT BLEEDING: ICD-10-CM

## 2022-01-07 LAB
25(OH)D3 SERPL-MCNC: 74 NG/ML (ref 30–100)
ALBUMIN SERPL BCP-MCNC: 4.5 G/DL (ref 3.2–4.9)
ALBUMIN/GLOB SERPL: 1.9 G/DL
ALP SERPL-CCNC: 79 U/L (ref 30–99)
ALT SERPL-CCNC: 17 U/L (ref 2–50)
ANION GAP SERPL CALC-SCNC: 12 MMOL/L (ref 7–16)
AST SERPL-CCNC: 18 U/L (ref 12–45)
BASOPHILS # BLD AUTO: 0.8 % (ref 0–1.8)
BASOPHILS # BLD: 0.04 K/UL (ref 0–0.12)
BILIRUB SERPL-MCNC: 1.5 MG/DL (ref 0.1–1.5)
BUN SERPL-MCNC: 12 MG/DL (ref 8–22)
CALCIUM SERPL-MCNC: 9.2 MG/DL (ref 8.5–10.5)
CHLORIDE SERPL-SCNC: 103 MMOL/L (ref 96–112)
CO2 SERPL-SCNC: 22 MMOL/L (ref 20–33)
CREAT SERPL-MCNC: 0.6 MG/DL (ref 0.5–1.4)
EOSINOPHIL # BLD AUTO: 0.1 K/UL (ref 0–0.51)
EOSINOPHIL NFR BLD: 2 % (ref 0–6.9)
ERYTHROCYTE [DISTWIDTH] IN BLOOD BY AUTOMATED COUNT: 40.3 FL (ref 35.9–50)
GLOBULIN SER CALC-MCNC: 2.4 G/DL (ref 1.9–3.5)
GLUCOSE SERPL-MCNC: 94 MG/DL (ref 65–99)
HCT VFR BLD AUTO: 41.7 % (ref 37–47)
HGB BLD-MCNC: 14.3 G/DL (ref 12–16)
IMM GRANULOCYTES # BLD AUTO: 0.01 K/UL (ref 0–0.11)
IMM GRANULOCYTES NFR BLD AUTO: 0.2 % (ref 0–0.9)
LYMPHOCYTES # BLD AUTO: 1.65 K/UL (ref 1–4.8)
LYMPHOCYTES NFR BLD: 33.5 % (ref 22–41)
MCH RBC QN AUTO: 32 PG (ref 27–33)
MCHC RBC AUTO-ENTMCNC: 34.3 G/DL (ref 33.6–35)
MCV RBC AUTO: 93.3 FL (ref 81.4–97.8)
MONOCYTES # BLD AUTO: 0.56 K/UL (ref 0–0.85)
MONOCYTES NFR BLD AUTO: 11.4 % (ref 0–13.4)
NEUTROPHILS # BLD AUTO: 2.57 K/UL (ref 2–7.15)
NEUTROPHILS NFR BLD: 52.1 % (ref 44–72)
NRBC # BLD AUTO: 0 K/UL
NRBC BLD-RTO: 0 /100 WBC
PLATELET # BLD AUTO: 389 K/UL (ref 164–446)
PMV BLD AUTO: 9.9 FL (ref 9–12.9)
POTASSIUM SERPL-SCNC: 4.3 MMOL/L (ref 3.6–5.5)
PROT SERPL-MCNC: 6.9 G/DL (ref 6–8.2)
RBC # BLD AUTO: 4.47 M/UL (ref 4.2–5.4)
SODIUM SERPL-SCNC: 137 MMOL/L (ref 135–145)
VIT B12 SERPL-MCNC: 640 PG/ML (ref 211–911)
WBC # BLD AUTO: 4.9 K/UL (ref 4.8–10.8)

## 2022-01-07 PROCEDURE — 36415 COLL VENOUS BLD VENIPUNCTURE: CPT

## 2022-01-07 PROCEDURE — 85025 COMPLETE CBC W/AUTO DIFF WBC: CPT

## 2022-01-07 PROCEDURE — 82607 VITAMIN B-12: CPT

## 2022-01-07 PROCEDURE — 80053 COMPREHEN METABOLIC PANEL: CPT

## 2022-01-07 PROCEDURE — 82306 VITAMIN D 25 HYDROXY: CPT

## 2022-01-21 ENCOUNTER — OFFICE VISIT (OUTPATIENT)
Dept: MEDICAL GROUP | Facility: MEDICAL CENTER | Age: 37
End: 2022-01-21
Payer: COMMERCIAL

## 2022-01-21 VITALS
SYSTOLIC BLOOD PRESSURE: 122 MMHG | DIASTOLIC BLOOD PRESSURE: 74 MMHG | WEIGHT: 153.77 LBS | HEART RATE: 68 BPM | HEIGHT: 66 IN | TEMPERATURE: 98.4 F | OXYGEN SATURATION: 98 % | BODY MASS INDEX: 24.71 KG/M2

## 2022-01-21 DIAGNOSIS — M25.532 LEFT WRIST PAIN: ICD-10-CM

## 2022-01-21 DIAGNOSIS — R79.89 LOW VITAMIN B12 LEVEL: ICD-10-CM

## 2022-01-21 DIAGNOSIS — Z23 NEED FOR VACCINATION: ICD-10-CM

## 2022-01-21 DIAGNOSIS — M67.40 GANGLION CYST: ICD-10-CM

## 2022-01-21 DIAGNOSIS — R12 HEARTBURN: ICD-10-CM

## 2022-01-21 DIAGNOSIS — M54.2 NECK PAIN: ICD-10-CM

## 2022-01-21 PROCEDURE — 90471 IMMUNIZATION ADMIN: CPT | Performed by: FAMILY MEDICINE

## 2022-01-21 PROCEDURE — 99214 OFFICE O/P EST MOD 30 MIN: CPT | Mod: 25 | Performed by: FAMILY MEDICINE

## 2022-01-21 PROCEDURE — 90715 TDAP VACCINE 7 YRS/> IM: CPT | Performed by: FAMILY MEDICINE

## 2022-01-21 ASSESSMENT — FIBROSIS 4 INDEX: FIB4 SCORE: 0.42

## 2022-01-21 ASSESSMENT — PATIENT HEALTH QUESTIONNAIRE - PHQ9: CLINICAL INTERPRETATION OF PHQ2 SCORE: 0

## 2022-01-24 NOTE — PROGRESS NOTES
"Subjective:     CC: Diagnoses of Need for vaccination, Heart burn, Ganglion cyst, Left wrist pain, Low vitamin B12 level, and Neck pain were pertinent to this visit.    HPI: Patient is a 36 y.o. female established patient who presents today to follow-up on labs.  Patient reports she is generally doing quite well.  Neck and shoulder pain improved with physical therapy.  Working out again.  Labs look great including B12, vitamin D, CBC and complete metabolic panel.      Heart burn  Patient has been working with gastroenterology.  She has cut out coffee, uses Pepcid as needed.  Off of her PPI.  Notes symptoms are generally well controlled.  Of note, B12 normal now.      Ganglion cyst  Patient has ganglion cyst on her left wrist.  She did have this surgically removed once, however, it has come back and is now causing pain.    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    • Seasonal allergies    • Shoulder tendonitis        Social History     Tobacco Use   • Smoking status: Never Smoker   • Smokeless tobacco: Never Used   Vaping Use   • Vaping Use: Never used   Substance Use Topics   • Alcohol use: Yes     Alcohol/week: 6.0 oz     Types: 10 Standard drinks or equivalent per week     Comment: 2-10 per week   • Drug use: No       Current Outpatient Medications Ordered in Epic   Medication Sig Dispense Refill   • levonorgestrel (KYLEENA) 19.5 mg (17.5 mcg/24 hr) IUD 1 Each by Intrauterine route one time.     • Magnesium 400 MG Tab Take  by mouth.     • VITAMIN D PO Take  by mouth.       No current Epic-ordered facility-administered medications on file.       Allergies:  Patient has no known allergies.    Health Maintenance: Completed      Objective:       Exam:  /74 (BP Location: Right arm, Patient Position: Sitting, BP Cuff Size: Adult long)   Pulse 68   Temp 36.9 °C (98.4 °F) (Temporal)   Ht 1.676 m (5' 6\") "   Wt 69.7 kg (153 lb 12.3 oz)   SpO2 98%   BMI 24.82 kg/m²  Body mass index is 24.82 kg/m².      General: Normal appearing. No distress.  HEAD: NCAT  EYES: conjunctiva clear, lids without ptosis, pupils equal  and reactive to light  EARS: ears normal shape and contour, canals are clear bilaterally, TMs clear  MOUTH: oropharynx is without erythema, edema or exudates.   Neck: Supple without masses. Thyroid is not enlarged. Normal ROM  Pulmonary: Clear to ausculation.  Normal effort. No rales, ronchi, or wheezing.  Cardiovascular: Regular rate and rhythm, no murmur. No LE edema  Neurologic: Grossly normal, no focal deficits  Lymph: No cervical or supraclavicular lymph nodes are palpable  Skin: Warm and dry.  No obvious lesions.  Musculoskeletal: Normal gait and station.   Psych: Normal mood and affect. Alert and oriented x3. Judgment and insight is normal.      Labs: 1/7/2022 results reviewed and discussed with the patient, questions answered.    Assessment & Plan:     36 y.o. female with the following -     1. Need for vaccination  - Tdap Vaccine =>6YO IM    2. Heart burn  Chronic problem, improving with dietary changes.  Uses Pepcid as needed.  Generally doing very well.    3. Ganglion cyst  Patient has a history of ganglion cyst on the left wrist which was surgically removed but has recurred.  Now causing pain.  Referral placed to Ortho.  - Referral to Orthopedics    4. Left wrist pain  - Referral to Orthopedics    5. Low vitamin B12 level  Chronic problem, now resolved.  Likely due to gastritis previously which is improving.  Not on supplementation now.    6. Neck pain  Chronic problem, resolved with physical therapy.    Return if symptoms worsen or fail to improve.    Please note that this dictation was created using voice recognition software. I have made every reasonable attempt to correct obvious errors, but I expect that there are errors of grammar and possibly content that I did not discover before  finalizing the note.

## 2022-02-07 ENCOUNTER — OFFICE VISIT (OUTPATIENT)
Dept: DERMATOLOGY | Facility: IMAGING CENTER | Age: 37
End: 2022-02-07
Payer: COMMERCIAL

## 2022-02-07 DIAGNOSIS — L91.0 KELOID SCAR: ICD-10-CM

## 2022-02-07 PROCEDURE — 11900 INJECT SKIN LESIONS </W 7: CPT | Performed by: NURSE PRACTITIONER

## 2022-02-07 RX ORDER — TRIAMCINOLONE ACETONIDE 40 MG/ML
40 INJECTION, SUSPENSION INTRA-ARTICULAR; INTRAMUSCULAR ONCE
Status: COMPLETED | OUTPATIENT
Start: 2022-02-07 | End: 2022-02-07

## 2022-02-07 RX ADMIN — TRIAMCINOLONE ACETONIDE 40 MG: 40 INJECTION, SUSPENSION INTRA-ARTICULAR; INTRAMUSCULAR at 11:37

## 2022-02-07 NOTE — PROGRESS NOTES
DERMATOLOGY NOTE  FOLLOW UP VISIT       Chief complaint: Scar     PI/location: right shoulder, keloid scars-post shoulder surgery-itching and burning   Time present: 2018  Painful lesion: Yes  Itching lesion: Yes  Enlarging lesion: Yes  Anything make it better or worse? No      History of skin cancer: No  History of precancers/actinic keratoses: No  History of biopsies:Yes, Details: left forearm , resulted AK   History of blistering/severe sunburns:No  Family history of skin cancer:No  Family history of atypical moles:No      No Known Allergies     MEDICATIONS:  Medications relevant to specialty reviewed.     REVIEW OF SYSTEMS:   Positive for skin (see HPI)  Negative for fevers and chills       EXAM:  There were no vitals taken for this visit.  Constitutional: Well-developed, well-nourished, and in no distress.     A focused skin exam was performed including the affected areas of the R shoulder. Notable findings on exam today listed below and/or in assessment/plan.     Keloid scars to R shoulder--1 anterior aspect, 3 to posterior, anterior and over AC joint    IMPRESSION / PLAN:    1. Keloid scar  To large anterior keloid, injected 40mg/ml, 0.2 ml given, wasted 0.8 ml. To the other 3, injected 20 mg/ml--total of 0.3 ml given, wasted 0.7 ml  - triamcinolone acetonide (KENALOG-40) injection 40 mg  - triamcinolone acetonide (KENALOG-40) injection 40 mg     Discussed risks, benefits, alternative treatments as well as common side effects associated with prescribed treatment  Patient verbalized understanding and agrees with plan regarding the above    I have performed a physical exam and reviewed and updated ROS and Plan today (2/7/2022). In review of dermatology visit (12/20/2021), there are no changes except as documented above.       Please note that this dictation was created using voice recognition software. I have made every reasonable attempt to correct obvious errors, but I expect that there are errors of grammar  and possibly content that I did not discover before finalizing the note.    .    Return to clinic in: Return in about 6 weeks (around 3/21/2022) for ILK keloids on R shoulder. and as needed for any new or changing skin lesions.

## 2022-02-24 ENCOUNTER — APPOINTMENT (OUTPATIENT)
Dept: PHARMACY | Facility: MEDICAL CENTER | Age: 37
End: 2022-02-24
Payer: COMMERCIAL

## 2022-02-24 ENCOUNTER — PHARMACY VISIT (OUTPATIENT)
Dept: PHARMACY | Facility: MEDICAL CENTER | Age: 37
End: 2022-02-24
Payer: COMMERCIAL

## 2022-02-24 PROCEDURE — RXMED WILLOW AMBULATORY MEDICATION CHARGE: Performed by: INTERNAL MEDICINE

## 2022-02-24 RX ORDER — RNA INGREDIENT BNT-162B2 0.23 G/1.8ML
0.3 INJECTION, SUSPENSION INTRAMUSCULAR
Qty: 0.3 ML | Refills: 0 | OUTPATIENT
Start: 2022-02-24

## 2022-03-08 ENCOUNTER — EH NON-PROVIDER (OUTPATIENT)
Dept: OCCUPATIONAL MEDICINE | Facility: CLINIC | Age: 37
End: 2022-03-08

## 2022-03-08 DIAGNOSIS — Z02.89 ENCOUNTER FOR OCCUPATIONAL HEALTH EXAMINATION INVOLVING RESPIRATOR: ICD-10-CM

## 2022-03-08 PROCEDURE — 94375 RESPIRATORY FLOW VOLUME LOOP: CPT | Performed by: NURSE PRACTITIONER

## 2022-05-06 ENCOUNTER — HOSPITAL ENCOUNTER (OUTPATIENT)
Dept: LAB | Facility: MEDICAL CENTER | Age: 37
End: 2022-05-06
Payer: COMMERCIAL

## 2022-05-19 ENCOUNTER — HOSPITAL ENCOUNTER (OUTPATIENT)
Dept: LAB | Facility: MEDICAL CENTER | Age: 37
End: 2022-05-19
Attending: OBSTETRICS & GYNECOLOGY
Payer: COMMERCIAL

## 2022-05-19 LAB
CHOLEST SERPL-MCNC: 162 MG/DL (ref 100–199)
HDLC SERPL-MCNC: 56 MG/DL
LDLC SERPL CALC-MCNC: 96 MG/DL
TRIGL SERPL-MCNC: 52 MG/DL (ref 0–149)

## 2022-05-19 PROCEDURE — 80061 LIPID PANEL: CPT

## 2022-05-19 PROCEDURE — 36415 COLL VENOUS BLD VENIPUNCTURE: CPT

## 2022-09-27 ENCOUNTER — IMMUNIZATION (OUTPATIENT)
Dept: OCCUPATIONAL MEDICINE | Facility: CLINIC | Age: 37
End: 2022-09-27

## 2022-09-27 DIAGNOSIS — Z23 NEED FOR VACCINATION: Primary | ICD-10-CM

## 2022-09-27 PROCEDURE — 90686 IIV4 VACC NO PRSV 0.5 ML IM: CPT | Performed by: PREVENTIVE MEDICINE

## 2022-12-07 ENCOUNTER — OFFICE VISIT (OUTPATIENT)
Dept: DERMATOLOGY | Facility: IMAGING CENTER | Age: 37
End: 2022-12-07
Payer: COMMERCIAL

## 2022-12-07 DIAGNOSIS — L91.0 KELOID: ICD-10-CM

## 2022-12-07 PROCEDURE — 11900 INJECT SKIN LESIONS </W 7: CPT | Performed by: NURSE PRACTITIONER

## 2022-12-07 RX ORDER — TRIAMCINOLONE ACETONIDE 40 MG/ML
40 INJECTION, SUSPENSION INTRA-ARTICULAR; INTRAMUSCULAR ONCE
Status: COMPLETED | OUTPATIENT
Start: 2022-12-07 | End: 2022-12-07

## 2022-12-07 RX ADMIN — TRIAMCINOLONE ACETONIDE 40 MG: 40 INJECTION, SUSPENSION INTRA-ARTICULAR; INTRAMUSCULAR at 11:48

## 2022-12-07 NOTE — PROGRESS NOTES
DERMATOLOGY NOTE  FOLLOW UP VISIT       Chief complaint: Follow-Up     Tx on keloid scar on rt shoulder , also might have a possible keloid on groin area GYN took a bx  in that area in the summer and the bx site has hard tissue and is slight itchy    PI/location: right shoulder, keloid scars-post shoulder surgery-itching and burning   Time present: 2018  Painful lesion: Yes  Itching lesion: Yes  Enlarging lesion: Yes  Anything make it better or worse? No      History of skin cancer: No  History of precancers/actinic keratoses: No  History of biopsies:Yes, Details: left forearm , resulted AK   History of blistering/severe sunburns:No  Family history of skin cancer:No  Family history of atypical moles:No      No Known Allergies     MEDICATIONS:  Medications relevant to specialty reviewed.     REVIEW OF SYSTEMS:   Positive for skin (see HPI)  Negative for fevers and chills       EXAM:  There were no vitals taken for this visit.  Constitutional: Well-developed, well-nourished, and in no distress.     A focused skin exam was performed including the affected areas of the R shoulder. Notable findings on exam today listed below and/or in assessment/plan.     Keloid scars to R shoulder--1 anterior aspect, 1 to posterior, over AC joint and one new one to suprapubic region after mole excision    IMPRESSION / PLAN:    1. Keloid scar  Total given to 4 keloid scars--0.2 ml, wasted 0.8 ml, discussed risks associated with ILK  Advised to return in 4-6 weeks for continued tx if needed  - triamcinolone acetonide (KENALOG-40) injection 40 mg       Discussed risks, benefits, alternative treatments as well as common side effects associated with prescribed treatment  Patient verbalized understanding and agrees with plan regarding the above    I have performed a physical exam and reviewed and updated ROS and Plan today (12/7/2022). In review of dermatology visit (2/7/2022), there are no changes except as documented above.       Please  note that this dictation was created using voice recognition software. I have made every reasonable attempt to correct obvious errors, but I expect that there are errors of grammar and possibly content that I did not discover before finalizing the note.      Return to clinic in: Return for 4-6 week, ILK for keloids. and as needed for any new or changing skin lesions.

## 2022-12-19 ENCOUNTER — APPOINTMENT (OUTPATIENT)
Dept: DERMATOLOGY | Facility: IMAGING CENTER | Age: 37
End: 2022-12-19

## 2023-01-09 ENCOUNTER — APPOINTMENT (OUTPATIENT)
Dept: DERMATOLOGY | Facility: IMAGING CENTER | Age: 38
End: 2023-01-09
Payer: COMMERCIAL

## 2023-01-16 ENCOUNTER — OFFICE VISIT (OUTPATIENT)
Dept: DERMATOLOGY | Facility: IMAGING CENTER | Age: 38
End: 2023-01-16
Payer: COMMERCIAL

## 2023-01-16 DIAGNOSIS — L91.0 KELOID SCAR: ICD-10-CM

## 2023-01-16 DIAGNOSIS — L90.8 SKIN AGING: ICD-10-CM

## 2023-01-16 PROCEDURE — 99213 OFFICE O/P EST LOW 20 MIN: CPT | Mod: 25 | Performed by: NURSE PRACTITIONER

## 2023-01-16 PROCEDURE — 11900 INJECT SKIN LESIONS </W 7: CPT | Performed by: NURSE PRACTITIONER

## 2023-01-16 RX ORDER — TRIAMCINOLONE ACETONIDE 40 MG/ML
40 INJECTION, SUSPENSION INTRA-ARTICULAR; INTRAMUSCULAR ONCE
Status: COMPLETED | OUTPATIENT
Start: 2023-01-16 | End: 2023-01-16

## 2023-01-16 RX ORDER — TRETINOIN 0.5 MG/G
CREAM TOPICAL
Qty: 45 G | Refills: 1 | Status: SHIPPED | OUTPATIENT
Start: 2023-01-16

## 2023-01-16 RX ADMIN — TRIAMCINOLONE ACETONIDE 40 MG: 40 INJECTION, SUSPENSION INTRA-ARTICULAR; INTRAMUSCULAR at 16:06

## 2023-01-16 NOTE — PROGRESS NOTES
DERMATOLOGY NOTE  FOLLOW UP VISIT       Chief complaint: Follow-Up    Ilk Inj on rt shoulder    PI/location: right shoulder, keloid scars-post shoulder surgery-itching and burning   Time present: 2018  Painful lesion: Yes  Itching lesion: Yes  Enlarging lesion: Yes  Anything make it better or worse? No      History of skin cancer: No  History of precancers/actinic keratoses: No  History of biopsies:Yes, Details: left forearm , resulted AK   History of blistering/severe sunburns:No  Family history of skin cancer:No  Family history of atypical moles:No      No Known Allergies     MEDICATIONS:  Medications relevant to specialty reviewed.     REVIEW OF SYSTEMS:   Positive for skin (see HPI)  Negative for fevers and chills       EXAM:  There were no vitals taken for this visit.  Constitutional: Well-developed, well-nourished, and in no distress.     A focused skin exam was performed including the affected areas of the R shoulder. Notable findings on exam today listed below and/or in assessment/plan.     Keloid scars to R shoulder--1 anterior aspect, 1 to posterior, over AC joint and one new one to suprapubic region    IMPRESSION / PLAN:    1. Keloid scar  Total given to 4 keloid scars--0.3 ml, wasted 0.7 ml, discussed risks associated with ILK  Advised to return in 4-6 weeks for continued tx if needed  - triamcinolone acetonide (KENALOG-40) injection 40 mg    1. Skin aging  Rx below, discussed R/B/AE, gave instructions on proper use  Stressed importance of sun protection  - tretinoin (RETIN-A) 0.05 % cream; AAA at bedtime, pea sized amount, start 1-2 times per week, increase as tolerated  Dispense: 45 g; Refill: 1       Discussed risks, benefits, alternative treatments as well as common side effects associated with prescribed treatment  Patient verbalized understanding and agrees with plan regarding the above    I have performed a physical exam and reviewed and updated ROS and Plan today (1/16/2023). In review of  dermatology visit (12/7/2022), there are no changes except as documented above.       Please note that this dictation was created using voice recognition software. I have made every reasonable attempt to correct obvious errors, but I expect that there are errors of grammar and possibly content that I did not discover before finalizing the note.      Return to clinic in: Return for PRN. and as needed for any new or changing skin lesions.

## 2023-03-10 ENCOUNTER — EH NON-PROVIDER (OUTPATIENT)
Dept: OCCUPATIONAL MEDICINE | Facility: CLINIC | Age: 38
End: 2023-03-10

## 2023-03-10 DIAGNOSIS — Z02.89 ENCOUNTER FOR OCCUPATIONAL HEALTH ASSESSMENT: ICD-10-CM

## 2023-03-10 PROCEDURE — 94375 RESPIRATORY FLOW VOLUME LOOP: CPT | Performed by: PREVENTIVE MEDICINE

## 2023-06-08 ENCOUNTER — OFFICE VISIT (OUTPATIENT)
Dept: DERMATOLOGY | Facility: IMAGING CENTER | Age: 38
End: 2023-06-08
Payer: COMMERCIAL

## 2023-06-08 DIAGNOSIS — D23.9 DERMATOFIBROMA: ICD-10-CM

## 2023-06-08 DIAGNOSIS — D18.01 CHERRY ANGIOMA: ICD-10-CM

## 2023-06-08 DIAGNOSIS — L81.4 LENTIGINES: ICD-10-CM

## 2023-06-08 DIAGNOSIS — L82.1 SK (SEBORRHEIC KERATOSIS): ICD-10-CM

## 2023-06-08 DIAGNOSIS — D22.9 MULTIPLE NEVI: ICD-10-CM

## 2023-06-08 DIAGNOSIS — L91.0 KELOID SCAR: ICD-10-CM

## 2023-06-08 DIAGNOSIS — Z12.83 SKIN CANCER SCREENING: ICD-10-CM

## 2023-06-08 PROCEDURE — 99213 OFFICE O/P EST LOW 20 MIN: CPT | Mod: 25 | Performed by: NURSE PRACTITIONER

## 2023-06-08 PROCEDURE — 11900 INJECT SKIN LESIONS </W 7: CPT | Performed by: NURSE PRACTITIONER

## 2023-06-08 RX ORDER — TRIAMCINOLONE ACETONIDE 40 MG/ML
40 INJECTION, SUSPENSION INTRA-ARTICULAR; INTRAMUSCULAR ONCE
Status: COMPLETED | OUTPATIENT
Start: 2023-06-08 | End: 2023-06-08

## 2023-06-08 RX ADMIN — TRIAMCINOLONE ACETONIDE 40 MG: 40 INJECTION, SUSPENSION INTRA-ARTICULAR; INTRAMUSCULAR at 16:09

## 2023-06-08 NOTE — PROGRESS NOTES
DERMATOLOGY NOTE   VISIT       Chief complaint: MELO      No new concerns today   History of skin cancer: No  History of precancers/actinic keratoses: No  History of biopsies:Yes, Details: left forearm , resulted AK   History of blistering/severe sunburns:No  Family history of skin cancer:No  Family history of atypical moles:No        No Known Allergies     MEDICATIONS:  Medications relevant to specialty reviewed.     REVIEW OF SYSTEMS:   Positive for skin (see HPI)  Negative for fevers and chills       EXAM:  There were no vitals taken for this visit.  Constitutional: Well-developed, well-nourished, and in no distress.     A total body skin exam was performed excluding the genitals per patient preference and including the following areas: head (including face), neck, chest, abdomen, groin/buttocks, back, bilateral upper extremities, and bilateral lower extremities with the following pertinent findings listed below and/or in assessment/plan.     -sun exposed skin of trunk and b/l upper, lower extremities and face with scattered clinically benign light brown reticulated macules all of which were morphologically similar and none of which were suspicious for skin cancer today on exam  Several scattered 1-3mm bright red macules and thin papules on the  extend to scalp ,trunk and extremities  Multiple tan medium brown dark brown skin-colored macules papules scattered over the trunk >> extremities--All with benign-appearing pigment network patterns on dermoscopy  Several tan medium brown stuck-on waxy papules scattered on the trunk and extremities  DF right posterior thigh   Keloid right shoulder , super pubic region       IMPRESSION / PLAN:    1. Lentigines  - Benign-appearing nature of lesions discussed during exam.   - Advised to continue to monitor for any return to clinic for new or concerning changes.      2. Cherry angioma  - Benign-appearing nature of lesions discussed during exam.   - Advised to continue to  monitor for any return to clinic for new or concerning changes.      3. Multiple nevi  - Benign-appearing nature of lesions discussed during exam.   - Advised to continue to monitor for any return to clinic for new or concerning changes.  - ABCDE's of melanoma discussed/handout given      4. Dermatofibroma  - Benign-appearing nature of lesions discussed during exam.   - Advised to continue to monitor for any return to clinic for new or concerning changes.      5. SK (seborrheic keratosis)  - Benign-appearing nature of lesions discussed during exam.   - Advised to continue to monitor for any return to clinic for new or concerning changes.      6. Keloid scar  ILK to 2 scars, 1 R shoulder, 1 to suprapubic region, bothersome and itchy  Gave total 0.05 ml for 2 lesions  Tolerated well  - triamcinolone acetonide (KENALOG-40) injection 40 mg    7. Skin cancer screening  Skin cancer education  discussed importance of sun protective clothing, eyewear in addition to the use of broad spectrum sunscreen with SPF 30 or greater, as well as need for reapplication ~every 2 hours when exposed to UVR  discussed importance following up for any new or changing lesions as noted in handout given, but every 12 months exams in clinic in the setting of dermatologic history  ABCDE's of melanoma discussed/handout given        Discussed risks, benefits, alternative treatments as well as common side effects associated with prescribed treatment, Patient verbalized understanding and agrees with plan regarding the above      \  Please note that this dictation was created using voice recognition software. I have made every reasonable attempt to correct obvious errors, but I expect that there are errors of grammar and possibly content that I did not discover before finalizing the note.      Return to clinic in: Return in about 1 year (around 6/8/2024) for MELO. and as needed for any new or changing skin lesions.

## 2023-09-28 ENCOUNTER — IMMUNIZATION (OUTPATIENT)
Dept: OCCUPATIONAL MEDICINE | Facility: CLINIC | Age: 38
End: 2023-09-28

## 2023-09-28 DIAGNOSIS — Z23 NEED FOR VACCINATION: Primary | ICD-10-CM

## 2023-09-28 PROCEDURE — 90686 IIV4 VACC NO PRSV 0.5 ML IM: CPT | Performed by: PREVENTIVE MEDICINE

## 2023-12-13 ENCOUNTER — APPOINTMENT (OUTPATIENT)
Dept: DERMATOLOGY | Facility: IMAGING CENTER | Age: 38
End: 2023-12-13
Payer: COMMERCIAL

## 2024-01-22 ENCOUNTER — APPOINTMENT (OUTPATIENT)
Dept: DERMATOLOGY | Facility: IMAGING CENTER | Age: 39
End: 2024-01-22
Payer: COMMERCIAL

## 2024-01-23 SDOH — HEALTH STABILITY: PHYSICAL HEALTH: ON AVERAGE, HOW MANY MINUTES DO YOU ENGAGE IN EXERCISE AT THIS LEVEL?: 30 MIN

## 2024-01-23 SDOH — ECONOMIC STABILITY: HOUSING INSECURITY
IN THE LAST 12 MONTHS, WAS THERE A TIME WHEN YOU DID NOT HAVE A STEADY PLACE TO SLEEP OR SLEPT IN A SHELTER (INCLUDING NOW)?: NO

## 2024-01-23 SDOH — HEALTH STABILITY: MENTAL HEALTH
STRESS IS WHEN SOMEONE FEELS TENSE, NERVOUS, ANXIOUS, OR CAN'T SLEEP AT NIGHT BECAUSE THEIR MIND IS TROUBLED. HOW STRESSED ARE YOU?: ONLY A LITTLE

## 2024-01-23 SDOH — ECONOMIC STABILITY: HOUSING INSECURITY: IN THE LAST 12 MONTHS, HOW MANY PLACES HAVE YOU LIVED?: 1

## 2024-01-23 SDOH — HEALTH STABILITY: PHYSICAL HEALTH: ON AVERAGE, HOW MANY DAYS PER WEEK DO YOU ENGAGE IN MODERATE TO STRENUOUS EXERCISE (LIKE A BRISK WALK)?: 5 DAYS

## 2024-01-23 SDOH — ECONOMIC STABILITY: FOOD INSECURITY: WITHIN THE PAST 12 MONTHS, THE FOOD YOU BOUGHT JUST DIDN'T LAST AND YOU DIDN'T HAVE MONEY TO GET MORE.: NEVER TRUE

## 2024-01-23 SDOH — ECONOMIC STABILITY: TRANSPORTATION INSECURITY
IN THE PAST 12 MONTHS, HAS LACK OF TRANSPORTATION KEPT YOU FROM MEETINGS, WORK, OR FROM GETTING THINGS NEEDED FOR DAILY LIVING?: NO

## 2024-01-23 SDOH — ECONOMIC STABILITY: FOOD INSECURITY: WITHIN THE PAST 12 MONTHS, YOU WORRIED THAT YOUR FOOD WOULD RUN OUT BEFORE YOU GOT MONEY TO BUY MORE.: NEVER TRUE

## 2024-01-23 SDOH — ECONOMIC STABILITY: TRANSPORTATION INSECURITY
IN THE PAST 12 MONTHS, HAS LACK OF RELIABLE TRANSPORTATION KEPT YOU FROM MEDICAL APPOINTMENTS, MEETINGS, WORK OR FROM GETTING THINGS NEEDED FOR DAILY LIVING?: NO

## 2024-01-23 SDOH — ECONOMIC STABILITY: INCOME INSECURITY: HOW HARD IS IT FOR YOU TO PAY FOR THE VERY BASICS LIKE FOOD, HOUSING, MEDICAL CARE, AND HEATING?: NOT HARD AT ALL

## 2024-01-23 SDOH — ECONOMIC STABILITY: INCOME INSECURITY: IN THE LAST 12 MONTHS, WAS THERE A TIME WHEN YOU WERE NOT ABLE TO PAY THE MORTGAGE OR RENT ON TIME?: NO

## 2024-01-23 SDOH — ECONOMIC STABILITY: TRANSPORTATION INSECURITY
IN THE PAST 12 MONTHS, HAS THE LACK OF TRANSPORTATION KEPT YOU FROM MEDICAL APPOINTMENTS OR FROM GETTING MEDICATIONS?: NO

## 2024-01-23 ASSESSMENT — SOCIAL DETERMINANTS OF HEALTH (SDOH)
HOW OFTEN DO YOU HAVE A DRINK CONTAINING ALCOHOL: 2-3 TIMES A WEEK
ARE YOU MARRIED, WIDOWED, DIVORCED, SEPARATED, NEVER MARRIED, OR LIVING WITH A PARTNER?: LIVING WITH PARTNER
HOW OFTEN DO YOU GET TOGETHER WITH FRIENDS OR RELATIVES?: ONCE A WEEK
DO YOU BELONG TO ANY CLUBS OR ORGANIZATIONS SUCH AS CHURCH GROUPS UNIONS, FRATERNAL OR ATHLETIC GROUPS, OR SCHOOL GROUPS?: NO
ARE YOU MARRIED, WIDOWED, DIVORCED, SEPARATED, NEVER MARRIED, OR LIVING WITH A PARTNER?: LIVING WITH PARTNER
HOW OFTEN DO YOU ATTEND CHURCH OR RELIGIOUS SERVICES?: NEVER
HOW MANY DRINKS CONTAINING ALCOHOL DO YOU HAVE ON A TYPICAL DAY WHEN YOU ARE DRINKING: 5 OR 6
HOW HARD IS IT FOR YOU TO PAY FOR THE VERY BASICS LIKE FOOD, HOUSING, MEDICAL CARE, AND HEATING?: NOT HARD AT ALL
HOW OFTEN DO YOU HAVE SIX OR MORE DRINKS ON ONE OCCASION: MONTHLY
WITHIN THE PAST 12 MONTHS, YOU WORRIED THAT YOUR FOOD WOULD RUN OUT BEFORE YOU GOT THE MONEY TO BUY MORE: NEVER TRUE
DO YOU BELONG TO ANY CLUBS OR ORGANIZATIONS SUCH AS CHURCH GROUPS UNIONS, FRATERNAL OR ATHLETIC GROUPS, OR SCHOOL GROUPS?: NO
HOW OFTEN DO YOU GET TOGETHER WITH FRIENDS OR RELATIVES?: ONCE A WEEK
IN A TYPICAL WEEK, HOW MANY TIMES DO YOU TALK ON THE PHONE WITH FAMILY, FRIENDS, OR NEIGHBORS?: THREE TIMES A WEEK
IN A TYPICAL WEEK, HOW MANY TIMES DO YOU TALK ON THE PHONE WITH FAMILY, FRIENDS, OR NEIGHBORS?: THREE TIMES A WEEK
HOW OFTEN DO YOU ATTEND CHURCH OR RELIGIOUS SERVICES?: NEVER

## 2024-01-23 ASSESSMENT — LIFESTYLE VARIABLES
AUDIT-C TOTAL SCORE: 7
HOW OFTEN DO YOU HAVE A DRINK CONTAINING ALCOHOL: 2-3 TIMES A WEEK
SKIP TO QUESTIONS 9-10: 0
HOW MANY STANDARD DRINKS CONTAINING ALCOHOL DO YOU HAVE ON A TYPICAL DAY: 5 OR 6
HOW OFTEN DO YOU HAVE SIX OR MORE DRINKS ON ONE OCCASION: MONTHLY

## 2024-01-24 ENCOUNTER — OFFICE VISIT (OUTPATIENT)
Dept: MEDICAL GROUP | Facility: MEDICAL CENTER | Age: 39
End: 2024-01-24
Payer: COMMERCIAL

## 2024-01-24 VITALS
OXYGEN SATURATION: 97 % | HEART RATE: 71 BPM | HEIGHT: 66 IN | SYSTOLIC BLOOD PRESSURE: 114 MMHG | BODY MASS INDEX: 25.3 KG/M2 | DIASTOLIC BLOOD PRESSURE: 86 MMHG | WEIGHT: 157.41 LBS | TEMPERATURE: 98.8 F

## 2024-01-24 DIAGNOSIS — F41.9 ANXIETY: ICD-10-CM

## 2024-01-24 DIAGNOSIS — M54.41 CHRONIC BILATERAL LOW BACK PAIN WITH BILATERAL SCIATICA: ICD-10-CM

## 2024-01-24 DIAGNOSIS — M54.42 CHRONIC BILATERAL LOW BACK PAIN WITH BILATERAL SCIATICA: ICD-10-CM

## 2024-01-24 DIAGNOSIS — R68.89 INTOLERANCE TO HEAT: ICD-10-CM

## 2024-01-24 DIAGNOSIS — T78.40XA ALLERGIC REACTION, INITIAL ENCOUNTER: ICD-10-CM

## 2024-01-24 DIAGNOSIS — K29.30 CHRONIC SUPERFICIAL GASTRITIS WITHOUT BLEEDING: ICD-10-CM

## 2024-01-24 DIAGNOSIS — R53.83 OTHER FATIGUE: ICD-10-CM

## 2024-01-24 DIAGNOSIS — G89.29 CHRONIC BILATERAL LOW BACK PAIN WITH BILATERAL SCIATICA: ICD-10-CM

## 2024-01-24 PROBLEM — R12 HEARTBURN: Status: RESOLVED | Noted: 2020-01-10 | Resolved: 2024-01-24

## 2024-01-24 PROBLEM — Z30.09 FAMILY PLANNING: Status: RESOLVED | Noted: 2018-04-25 | Resolved: 2024-01-24

## 2024-01-24 PROBLEM — R79.89 LOW VITAMIN B12 LEVEL: Status: RESOLVED | Noted: 2020-02-07 | Resolved: 2024-01-24

## 2024-01-24 PROCEDURE — 99214 OFFICE O/P EST MOD 30 MIN: CPT | Performed by: STUDENT IN AN ORGANIZED HEALTH CARE EDUCATION/TRAINING PROGRAM

## 2024-01-24 PROCEDURE — 3074F SYST BP LT 130 MM HG: CPT | Performed by: STUDENT IN AN ORGANIZED HEALTH CARE EDUCATION/TRAINING PROGRAM

## 2024-01-24 PROCEDURE — 3079F DIAST BP 80-89 MM HG: CPT | Performed by: STUDENT IN AN ORGANIZED HEALTH CARE EDUCATION/TRAINING PROGRAM

## 2024-01-24 ASSESSMENT — ENCOUNTER SYMPTOMS
SHORTNESS OF BREATH: 0
WEIGHT LOSS: 0
VOMITING: 0
NAUSEA: 0
DIZZINESS: 0
FEVER: 0
HEADACHES: 0
WHEEZING: 0
PALPITATIONS: 0
CHILLS: 0

## 2024-01-24 ASSESSMENT — PATIENT HEALTH QUESTIONNAIRE - PHQ9: CLINICAL INTERPRETATION OF PHQ2 SCORE: 0

## 2024-01-24 NOTE — LETTER
[facility]  Tatyana Hinojosa M.D.  [address]  [place] [address]  Fax: [phone]   Authorization for Release/Disclosure of   Protected Health Information   Name: CARRI ORNELAS : 1985 SSN: [id number]   Address: [address] [place] [address]  [place] [address] Phone:    [phone] (home) [phone] (work)   I authorize the entity listed below to release/disclose the PHI below to:   [facility]/Tatyana Hinojosa M.D. and Juan Peñaloza M.D.   Provider or Entity Name:  [facility]   Address   City, State, [facility]   Phone:      Fax:     Reason for request: continuity of care   Information to be released:    [  ] LAST COLONOSCOPY,  including any PATH REPORT and follow-up  [  ] LAST FIT/COLOGUARD RESULT [  ] LAST DEXA  [  ] LAST MAMMOGRAM  [  ] LAST PAP  [  ] LAST LABS [  ] RETINA EXAM REPORT  [  ] IMMUNIZATION RECORDS  [  ] Release all info      [  ] Check here and initial the line next to each item to release ALL health information INCLUDING  _____ Care and treatment for drug and / or alcohol abuse  _____ HIV testing, infection status, or AIDS  _____ Genetic Testing    DATES OF SERVICE OR TIME PERIOD TO BE DISCLOSED: _____________  I understand and acknowledge that:  * This Authorization may be revoked at any time by you in writing, except if your health information has already been used or disclosed.  * Your health information that will be used or disclosed as a result of you signing this authorization could be re-disclosed by the recipient. If this occurs, your re-disclosed health information may no longer be protected by State or Federal laws.  * You may refuse to sign this Authorization. Your refusal will not affect your ability to obtain treatment.  * This Authorization becomes effective upon signing and will  on (date) __________.      If no date is indicated, this Authorization will  one (1) year from the signature date.    Name: Carri Ornelas  Signature: Date:   2024      PLEASE FAX REQUESTED RECORDS BACK TO: [phone]

## 2024-01-24 NOTE — PROGRESS NOTES
"Subjective:     CC:     HPI:   Kendra presents today with    Former patient of Tatyana Hinojosa M.D.   Follows with Derm  PMH of GERD, hx of labrum tear of right shoulder, IUD in place    Face is swollen  Had facial  Eye were swollen  Taking benadryl    Hot flashes/ intolerance to temperature- at night I cannot get comfortable, I have fan, I wake up drenched, I can find a way to keep a good temperature. Some times when in conversation she feels body heating up. Denies fever, chills, weight loss.       Problem   Chronic Superficial Gastritis Without Bleeding    Hx of gastritis on egd in 3/2020.      Low Vitamin B12 Level (Resolved)   Heart burn (Resolved)    IMO load March 2020     Family Planning (Resolved)   Allergic Response (Resolved)   Anxiety (Resolved)    Situational, during family event, required sertraline at the time.   Resolved.          Health Maintenance:     ROS:  Review of Systems   Constitutional:  Negative for chills, fever and weight loss.   HENT:  Negative for hearing loss.    Respiratory:  Negative for shortness of breath and wheezing.    Cardiovascular:  Negative for chest pain and palpitations.   Gastrointestinal:  Negative for nausea and vomiting.   Genitourinary:  Negative for frequency and urgency.   Skin:  Negative for rash.   Neurological:  Negative for dizziness and headaches.       Objective:     Exam:  /86 (BP Location: Left arm, Patient Position: Sitting)   Pulse 71   Temp 37.1 °C (98.8 °F) (Temporal)   Ht 1.664 m (5' 5.5\")   Wt 71.4 kg (157 lb 6.5 oz)   SpO2 97%   BMI 25.80 kg/m²  Body mass index is 25.8 kg/m².    Physical Exam  Constitutional:       Appearance: Normal appearance.   Cardiovascular:      Heart sounds: No murmur heard.  Pulmonary:      Effort: Pulmonary effort is normal.      Breath sounds: Normal breath sounds. No wheezing.   Musculoskeletal:      Cervical back: Normal range of motion and neck supple.   Neurological:      Mental Status: She is alert. "           Labs:     Assessment & Plan:     38 y.o. female with the following -   1. Allergic reaction, initial encounter  Acute, stable  Report recent had facial, and subsequent swelling of face, eye  Has been taking benadryl and sensitive skin regimen as needed with improvement    2. Anxiety  History of     3. Chronic bilateral low back pain with bilateral sciatica  Chronic stable    - Referral to Physical Therapy  - Referral to Pain Management  - Comp Metabolic Panel; Future  - CBC WITH DIFFERENTIAL; Future    4. Other fatigue  Chronic, stable  Hx of gastritis, hx of b12 deficiency  - VITAMIN D,25 HYDROXY (DEFICIENCY); Future  - Lipid Profile; Future  - TSH WITH REFLEX TO FT4; Future  - Comp Metabolic Panel; Future  - CBC WITH DIFFERENTIAL; Future    5. Intolerance to heat  Chronic stable  May consider hormone work up  No palpable cervical lad  Patient report self examins and have not noted any palpable LN  Denies weight loss  Reports some sensitivity to temperature at night, and feeling heated up on occasion when in conversation  - will check thyroid, cbc with diff discussed if symptoms persistent, worsens, please return to clinic, will consider further work up   - TSH WITH REFLEX TO FT4; Future  - CBC WITH DIFFERENTIAL; Future    6. Chronic superficial gastritis without bleeding  Chronic stable  Report history of   Previously required b12 supplement, previously on h2 blocker  Asymptomatic         Return in about 1 year (around 1/24/2025).    Please note that this dictation was created using voice recognition software. I have made every reasonable attempt to correct obvious errors, but I expect that there are errors of grammar and possibly content that I did not discover before finalizing the note.

## 2024-05-24 ENCOUNTER — HOSPITAL ENCOUNTER (OUTPATIENT)
Dept: RADIOLOGY | Facility: MEDICAL CENTER | Age: 39
End: 2024-05-24
Attending: FAMILY MEDICINE
Payer: COMMERCIAL

## 2024-05-24 DIAGNOSIS — Z12.31 VISIT FOR SCREENING MAMMOGRAM: ICD-10-CM

## 2024-07-29 ENCOUNTER — OFFICE VISIT (OUTPATIENT)
Dept: DERMATOLOGY | Facility: IMAGING CENTER | Age: 39
End: 2024-07-29
Payer: COMMERCIAL

## 2024-07-29 DIAGNOSIS — L82.1 SK (SEBORRHEIC KERATOSIS): ICD-10-CM

## 2024-07-29 DIAGNOSIS — L81.4 LENTIGINES: ICD-10-CM

## 2024-07-29 DIAGNOSIS — D23.9 DERMATOFIBROMA: ICD-10-CM

## 2024-07-29 DIAGNOSIS — L91.0 KELOID: ICD-10-CM

## 2024-07-29 DIAGNOSIS — D22.9 MULTIPLE NEVI: ICD-10-CM

## 2024-07-29 DIAGNOSIS — L21.9 SEBORRHEIC DERMATITIS: ICD-10-CM

## 2024-07-29 DIAGNOSIS — D18.01 CHERRY ANGIOMA: ICD-10-CM

## 2024-07-29 DIAGNOSIS — Z12.83 SKIN CANCER SCREENING: ICD-10-CM

## 2024-07-29 PROCEDURE — RXMED WILLOW AMBULATORY MEDICATION CHARGE: Performed by: NURSE PRACTITIONER

## 2024-07-29 RX ORDER — KETOCONAZOLE 20 MG/G
CREAM TOPICAL
Qty: 60 G | Refills: 3 | Status: SHIPPED | OUTPATIENT
Start: 2024-07-29

## 2024-07-30 ENCOUNTER — PHARMACY VISIT (OUTPATIENT)
Dept: PHARMACY | Facility: MEDICAL CENTER | Age: 39
End: 2024-07-30
Payer: COMMERCIAL

## 2024-09-26 ENCOUNTER — IMMUNIZATION (OUTPATIENT)
Dept: OCCUPATIONAL MEDICINE | Facility: CLINIC | Age: 39
End: 2024-09-26

## 2024-09-26 DIAGNOSIS — Z23 NEED FOR VACCINATION: Primary | ICD-10-CM

## 2024-10-02 PROCEDURE — 90656 IIV3 VACC NO PRSV 0.5 ML IM: CPT | Performed by: PREVENTIVE MEDICINE

## 2025-04-04 ENCOUNTER — APPOINTMENT (OUTPATIENT)
Dept: DERMATOLOGY | Facility: IMAGING CENTER | Age: 40
End: 2025-04-04

## 2025-06-06 ENCOUNTER — HOSPITAL ENCOUNTER (OUTPATIENT)
Dept: RADIOLOGY | Facility: MEDICAL CENTER | Age: 40
End: 2025-06-06
Attending: NURSE PRACTITIONER
Payer: COMMERCIAL

## 2025-06-06 DIAGNOSIS — Z12.31 VISIT FOR SCREENING MAMMOGRAM: ICD-10-CM

## 2025-06-06 PROCEDURE — 77063 BREAST TOMOSYNTHESIS BI: CPT

## (undated) DEVICE — FIBERWIRE 2.0 (12EA/BX)

## (undated) DEVICE — CANISTER SUCTION RIGID RED 1500CC (40EA/CA)

## (undated) DEVICE — ELECTRODE 850 FOAM ADHESIVE - HYDROGEL RADIOTRNSPRNT (50/PK)

## (undated) DEVICE — KIT ANESTHESIA W/CIRCUIT & 3/LT BAG W/FILTER (20EA/CA)

## (undated) DEVICE — MASK ANESTHESIA ADULT  - (100/CA)

## (undated) DEVICE — ABLATOR WAND SERFAS 90-S CRUISE

## (undated) DEVICE — GOWN WARMING STANDARD FLEX - (30/CA)

## (undated) DEVICE — TOWELS CLOTH SURGICAL - (4/PK 20PK/CA)

## (undated) DEVICE — DRESSING 3X8 ADAPTIC GAUZE - NON-ADHERING (36/PK 6PK/BX)

## (undated) DEVICE — DRAPETIBURON SHOULDER W/POUCH - (5EA/CA)

## (undated) DEVICE — TUBE CONNECTING SUCTION - CLEAR PLASTIC STERILE 72 IN (50EA/CA)

## (undated) DEVICE — KIT ROOM DECONTAMINATION

## (undated) DEVICE — CANNULA FULLY THREADED 8 X 75 (5EA/BX)

## (undated) DEVICE — NEPTUNE 4 PORT MANIFOLD - (20/PK)

## (undated) DEVICE — TUBE E-T HI-LO CUFF 6.5MM (10EA/BX)

## (undated) DEVICE — SHAVER 5.5 RESECTOR FORMULA (5EA/BX )

## (undated) DEVICE — SENSOR SPO2 NEO LNCS ADHESIVE (20/BX) SEE USER NOTES

## (undated) DEVICE — BLOCK

## (undated) DEVICE — SUTURE GENERAL

## (undated) DEVICE — GLOVE, LITE (PAIR)

## (undated) DEVICE — TUBING PUMP WITH CONNECTOR REDEUCE (1EA)

## (undated) DEVICE — TUBING PATIENT W/CONNECTOR REDEUCE (1EA)

## (undated) DEVICE — GLOVE SURGICAL PROTEXIS PI 8.0 LF - (50PR/BX)

## (undated) DEVICE — PROTECTOR ULNA NERVE - (36PR/CA)

## (undated) DEVICE — SHAVER4.0 AGGRESSIVE + FORMLA (5EA/BX)

## (undated) DEVICE — SUTURE 4-0 ETHILON FS-2 18 (36PK/BX)"

## (undated) DEVICE — CANNULA THREADED 5X75 (5EA/BX)

## (undated) DEVICE — NEEDLE SAFETY 18 GA X 1 1/2 IN (100EA/BX)

## (undated) DEVICE — DRAPE SHOULDER FLUID CONTROL - 77 X 85 (10/CA)

## (undated) DEVICE — GLOVE BIOGEL INDICATOR SZ 8.5 SURGICAL PF LTX - (50/BX 4BX/CA)

## (undated) DEVICE — ELECTRODE DUAL RETURN W/ CORD - (50/PK)

## (undated) DEVICE — LACTATED RINGERS INJ 1000 ML - (14EA/CA 60CA/PF)

## (undated) DEVICE — PACK SHOULDER ARTHROSCOPY SM - (2EA/CA)

## (undated) DEVICE — CHLORAPREP 26 ML APPLICATOR - ORANGE TINT(25/CA)

## (undated) DEVICE — SODIUM CHL. IRRIGATION 0.9% 3000ML (4EA/CA 65CA/PF)

## (undated) DEVICE — SUCTION INSTRUMENT YANKAUER BULBOUS TIP W/O VENT (50EA/CA)